# Patient Record
Sex: MALE | Race: WHITE | Employment: OTHER | URBAN - METROPOLITAN AREA
[De-identification: names, ages, dates, MRNs, and addresses within clinical notes are randomized per-mention and may not be internally consistent; named-entity substitution may affect disease eponyms.]

---

## 2017-01-04 ENCOUNTER — HOSPITAL ENCOUNTER (OUTPATIENT)
Dept: CT IMAGING | Age: 72
Discharge: HOME | End: 2017-01-04
Attending: SURGERY
Payer: MEDICARE

## 2017-01-04 VITALS — WEIGHT: 174 LBS | HEIGHT: 71 IN | BODY MASS INDEX: 24.36 KG/M2

## 2017-01-04 DIAGNOSIS — C18.9 COLON CARCINOMA (HCC): ICD-10-CM

## 2017-01-04 DIAGNOSIS — C44.509 CARCINOMA OF ABDOMINAL WALL: ICD-10-CM

## 2017-01-04 DIAGNOSIS — Z72.0 TOBACCO ABUSE: ICD-10-CM

## 2017-01-04 PROCEDURE — 74011000258 HC RX REV CODE- 258: Performed by: SURGERY

## 2017-01-04 PROCEDURE — 71260 CT THORAX DX C+: CPT

## 2017-01-04 PROCEDURE — 74011636320 HC RX REV CODE- 636/320: Performed by: SURGERY

## 2017-01-04 RX ORDER — SODIUM CHLORIDE 0.9 % (FLUSH) 0.9 %
10 SYRINGE (ML) INJECTION
Status: COMPLETED | OUTPATIENT
Start: 2017-01-04 | End: 2017-01-04

## 2017-01-04 RX ADMIN — Medication 10 ML: at 14:19

## 2017-01-04 RX ADMIN — IOPAMIDOL 80 ML: 755 INJECTION, SOLUTION INTRAVENOUS at 14:19

## 2017-01-04 RX ADMIN — SODIUM CHLORIDE 100 ML: 900 INJECTION, SOLUTION INTRAVENOUS at 14:20

## 2017-01-18 ENCOUNTER — HOSPITAL ENCOUNTER (OUTPATIENT)
Dept: RADIATION ONCOLOGY | Age: 72
Discharge: HOME | End: 2017-01-18
Payer: MEDICARE

## 2017-01-18 ENCOUNTER — HOSPITAL ENCOUNTER (OUTPATIENT)
Dept: LAB | Age: 72
Discharge: HOME | End: 2017-01-18
Payer: MEDICARE

## 2017-01-18 VITALS
OXYGEN SATURATION: 98 % | WEIGHT: 162.3 LBS | HEART RATE: 66 BPM | SYSTOLIC BLOOD PRESSURE: 99 MMHG | DIASTOLIC BLOOD PRESSURE: 65 MMHG | TEMPERATURE: 97.4 F | BODY MASS INDEX: 22.64 KG/M2

## 2017-01-18 DIAGNOSIS — C44.509 CARCINOMA OF ABDOMINAL WALL: ICD-10-CM

## 2017-01-18 LAB
ALBUMIN SERPL BCP-MCNC: 2.9 G/DL (ref 3.2–4.6)
ALBUMIN/GLOB SERPL: 0.7 {RATIO} (ref 1.2–3.5)
ALP SERPL-CCNC: 113 U/L (ref 50–136)
ALT SERPL-CCNC: 28 U/L (ref 12–65)
ANION GAP BLD CALC-SCNC: 4 MMOL/L (ref 7–16)
AST SERPL W P-5'-P-CCNC: 23 U/L (ref 15–37)
BASOPHILS # BLD AUTO: 0.1 K/UL (ref 0–0.2)
BASOPHILS # BLD: 1 % (ref 0–2)
BILIRUB SERPL-MCNC: 0.4 MG/DL (ref 0.2–1.1)
BUN SERPL-MCNC: 14 MG/DL (ref 8–23)
CALCIUM SERPL-MCNC: 9.1 MG/DL (ref 8.3–10.4)
CEA SERPL-MCNC: 2.5 NG/ML (ref 0–3)
CHLORIDE SERPL-SCNC: 105 MMOL/L (ref 98–107)
CO2 SERPL-SCNC: 30 MMOL/L (ref 23–32)
CREAT SERPL-MCNC: 0.83 MG/DL (ref 0.8–1.5)
DIFFERENTIAL METHOD BLD: ABNORMAL
EOSINOPHIL # BLD: 0.2 K/UL (ref 0–0.8)
EOSINOPHIL NFR BLD: 3 % (ref 0.5–7.8)
ERYTHROCYTE [DISTWIDTH] IN BLOOD BY AUTOMATED COUNT: 14.6 % (ref 11.9–14.6)
FERRITIN SERPL-MCNC: 613 NG/ML (ref 8–388)
GLOBULIN SER CALC-MCNC: 4.2 G/DL (ref 2.3–3.5)
GLUCOSE SERPL-MCNC: 104 MG/DL (ref 65–100)
HCT VFR BLD AUTO: 38.8 % (ref 41.1–50.3)
HGB BLD-MCNC: 12.3 G/DL (ref 13.6–17.2)
IRON SATN MFR SERPL: 48 %
IRON SERPL-MCNC: 105 UG/DL (ref 35–150)
LYMPHOCYTES # BLD AUTO: 27 % (ref 13–44)
LYMPHOCYTES # BLD: 1.5 K/UL (ref 0.5–4.6)
MAGNESIUM SERPL-MCNC: 2.2 MG/DL (ref 1.8–2.4)
MCH RBC QN AUTO: 29.9 PG (ref 26.1–32.9)
MCHC RBC AUTO-ENTMCNC: 31.7 G/DL (ref 31.4–35)
MCV RBC AUTO: 94.4 FL (ref 79.6–97.8)
MONOCYTES # BLD: 0.4 K/UL (ref 0.1–1.3)
MONOCYTES NFR BLD AUTO: 7 % (ref 4–12)
NEUTS SEG # BLD: 3.4 K/UL (ref 1.7–8.2)
NEUTS SEG NFR BLD AUTO: 61 % (ref 43–78)
NRBC # BLD: 0 K/UL (ref 0–0.2)
PLATELET # BLD AUTO: 305 K/UL (ref 150–450)
PMV BLD AUTO: 9.3 FL (ref 10.8–14.1)
POTASSIUM SERPL-SCNC: 4.2 MMOL/L (ref 3.5–5.1)
PROT SERPL-MCNC: 7.1 G/DL (ref 6.3–8.2)
RBC # BLD AUTO: 4.11 M/UL (ref 4.23–5.67)
SODIUM SERPL-SCNC: 139 MMOL/L (ref 136–145)
TIBC SERPL-MCNC: 221 UG/DL (ref 250–450)
WBC # BLD AUTO: 5.5 K/UL (ref 4.3–11.1)

## 2017-01-18 PROCEDURE — 80053 COMPREHEN METABOLIC PANEL: CPT | Performed by: INTERNAL MEDICINE

## 2017-01-18 PROCEDURE — 77470 SPECIAL RADIATION TREATMENT: CPT

## 2017-01-18 PROCEDURE — 83735 ASSAY OF MAGNESIUM: CPT | Performed by: INTERNAL MEDICINE

## 2017-01-18 PROCEDURE — 82728 ASSAY OF FERRITIN: CPT | Performed by: INTERNAL MEDICINE

## 2017-01-18 PROCEDURE — 83540 ASSAY OF IRON: CPT | Performed by: INTERNAL MEDICINE

## 2017-01-18 PROCEDURE — 85025 COMPLETE CBC W/AUTO DIFF WBC: CPT | Performed by: INTERNAL MEDICINE

## 2017-01-18 PROCEDURE — 36415 COLL VENOUS BLD VENIPUNCTURE: CPT | Performed by: INTERNAL MEDICINE

## 2017-01-18 PROCEDURE — 99211 OFF/OP EST MAY X REQ PHY/QHP: CPT

## 2017-01-18 PROCEDURE — 82378 CARCINOEMBRYONIC ANTIGEN: CPT | Performed by: INTERNAL MEDICINE

## 2017-01-18 NOTE — CONSULTS
Patient: Candice Pacheco MRN: [id number]  SSN: [id number]    YOB: 1945  Age: 80 y.o. Sex: male      Other Providers:  Rafi Davison MD, Belinda Griffin MD    CHIEF COMPLAINT: Cancer    DIAGNOSIS: [id number] with abdominal wall invasion and 3 mm margin. (Questionable liver lesion too small to characterize. PREVIOUS TREATMENT:  1) 12/13/16:     1. Open drainage of localized peritonitis. 2. Enterorrhaphy. 3. Right colectomy with ileocolostomy. 4. Open excision of peritoneal mass. 5. 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 HISTORY OF PRESENT ILLNESS:  Candice Pacheco is a 80 y.o. male who I am seeing at the request of Dr. Luna Ayala. He has a relatively benign medical history but is a former smoker and did not visit physicians regularly. He presented with a 3-4 week history of severe right lower quadrant abdominal pain. There was associated intermittent nausea and vomiting. He does not report a significant weight loss over this period. CT imaging showed an enhancing right lower quadrant mass associated with the cecal tip abutting the anterior inferior surface of the terminal ileum measuring 7.3 x 5.3 cm. This was felt to likely represent a primary colon neoplasm. There was also right inguinal and left inguinal hernia and a solitary 6 mm lesion in the left hepatic lobe which was felt to be too small to characterize. He was evaluated by surgery who planned for resection which took place 12/13/2016. 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 The total tumor size was 7.6 cm. There was no definite lymphovascular or perineural invasion identified but it was noted to be 0.3 cm from the inked radial margin. However, a separate submitted abdominal wall margin was submitted and negative. The peritoneal mass was a calcified peritoneal nodule and contained no malignancy.   0 of 12 lymph nodes identified contained tumor. He was seen by Dr. Jessica Lazcano postoperatively 12/28/2016 and noted to be doing well. Today he again notes he is doing well, eating normally with normal bowel movements. His incision midline is nearly fully healed at this point. He does report some difficult social dynamics caring for his wife who has fibromyalgia and prior stroke. He is also fairly simple but accompanied today by his son who is involved in his care. His case was discussed in tumor board and we felt radiation and medical oncology referral was necessary with potential radiation to prevent local recurrence which is the rationale for my visit today. PAST MEDICAL HISTORY:    Past Medical History   Diagnosis Date    Cancer [facility])     Prostate hypertrophy     Pulmonary embolism (HCC)     Smoker      1 pk/day x since age 26    Unspecified disorder of intestine        The patient denies history of collagen vascular diseases, pacemaker insertion, prior radiation or prior chemotherapy. PAST SURGICAL HISTORY:   Past Surgical History   Procedure Laterality Date    Hx gi         MEDICATIONS:     Current Outpatient Prescriptions:     LORazepam (ATIVAN) 0.5 mg tablet, Take 1 Tab by mouth three (3) times daily as needed for Anxiety. Max Daily Amount: 1.5 mg. Indications: ANXIETY, Disp: 45 Tab, Rfl: 0    tamsulosin (FLOMAX) 0.4 mg capsule, Take 1 Cap by mouth daily. Indications: Urinary Retention (Patient taking differently: Take 0.4 mg by mouth nightly. Indications: Urinary Retention), Disp: 30 Cap, Rfl: 3    ALLERGIES:   No Known Allergies    SOCIAL HISTORY:   Social History     Social History    Marital status:      Spouse name: N/A    Number of children: N/A    Years of education: N/A     Occupational History    Not on file.      Social History Main Topics    Smoking status: Former Smoker     Packs/day: 1.00     Types: Cigarettes     Quit date: 12/13/2016    Smokeless tobacco: Never Used   [employer] Alcohol use No    Drug use: No    Sexual activity: Not on file     Other Topics Concern    Not on file     Social History Narrative       FAMILY HISTORY:   Family History   Problem Relation Age of Onset    Cancer Mother      Pancreatic and Liver    Heart Disease Brother        REVIEW OF SYSTEMS: Please see the completed review of systems sheet in the chart that I have reviewed today. PHYSICAL EXAMINATION:   ECOG Performance status 1  VITAL SIGNS:   Visit Vitals    BP 99/65 (BP 1 Location: Left arm, BP Patient Position: Sitting)    Pulse 66    Temp 97.4 °F (36.3 °C) (Oral)    Wt 73.6 kg (162 lb 4.8 oz)    SpO2 98%    BMI 22.64 kg/m2        GENERAL: The patient is well-developed, ambulatory, alert and in no acute distress. HEENT: Head is normocephalic, atraumatic. Pupils are equal, round and reactive to light and accommodation. Extraocular movement intact. Hearing is intact bilaterally to finger rub. Oral cavity reveals no lesions. Mucous membranes are moist. NECK: Neck is supple with no masses. CARDIOVASCULAR: Heart is regular rate and rhythm. There are no murmurs rubs or gallups. Radial pulses are 2+ RESPIRATORY: Lungs are clear to auscultation and percussion. There is normal respiratory effort. GASTROINTESTINAL: The abdomen is soft, non-tender, nondistended with no hepatospelnomagaly. Large healing vertical midline incision. Digital rectal examination: deferred LYMPHATIC: There is no cervical, supraclavicular or axillary lymphadenopathy bilaterally. MUSCULOSKELETAL: Extremities reveal no cyanosis, clubbing or edema.  is 5+/5. NEURO:  Cranial nerves II-XII grossly intact. Muscular strength and sensation are intact throughout all four extremities.         PATHOLOGY:     12/13/16 s/p right hemicolectomy with resection of RLQ abd wall and ileocolostomy; Dr Chintan Linder: Ai Rose: - Andrea Mcintosh THE COLONIC WALL INTO THE ADJACENT ABDOMINAL WALL, 7.6 CM.   - DEFINITIVE LYMPHOVASCULAR OR PERINEURAL INVASION IS NOT IDENTIFIED.   - TUMOR IS 0.3 CM FROM THE INKED RADIAL MARGIN.   - TWELVE LYMPH NODES WITH NO TUMOR SEEN (0/12). - MARKED ACUTE SEROSITIS. B: ADDITIONAL RIGHT LOWER QUADRANT ABDOMINAL WALL: - FIBROFATTY TISSUE WITH ACUTE AND CHRONIC INFLAMMATION. NO TUMOR SEEN. C: FINAL MARGIN ABDOMINAL WALL: - FIBROFATTY TISSUE AND SKELETAL MUSCLE WITH ACUTE AND CHRONIC INFLAMMATION. NO TUMOR SEEN. D: Nery Gallon MASS: - CALCIFIED PERITONEAL NODULE. Electronically signed out on 12/15/2016 15:49 by Brittani Novoa M.D., Ph.D.          Brianna Alex:   Lab Results   Component Value Date/Time    Sodium 144 12/18/2016 05:24 AM    Potassium 3.6 12/18/2016 05:24 AM    Chloride 107 12/18/2016 05:24 AM    CO2 29 12/18/2016 05:24 AM    Anion gap 8 12/18/2016 05:24 AM    Glucose 105 12/18/2016 05:24 AM    BUN 3 12/18/2016 05:24 AM    Creatinine 0.50 12/18/2016 05:24 AM    GFR est AA >60 12/18/2016 05:24 AM    GFR est non-AA >60 12/18/2016 05:24 AM    Calcium 8.4 12/18/2016 05:24 AM    Albumin 2.7 12/12/2016 09:45 AM    Protein, total 7.2 12/12/2016 09:45 AM    Globulin 4.5 12/12/2016 09:45 AM    A-G Ratio 0.6 12/12/2016 09:45 AM    AST 49 12/12/2016 09:45 AM    ALT 34 12/12/2016 09:45 AM     Lab Results   Component Value Date/Time    WBC 4.8 12/18/2016 05:24 AM    HGB 11.2 12/18/2016 05:24 AM    HCT 34.8 12/18/2016 05:24 AM    PLATELET 339 17/83/6839 05:24 AM       RADIOLOGY:    I have personally reviewed the imaging and agree with the reports below.    Ct Chest W Cont    Result Date: 1/4/2017  CT of the chest with contrast. CLINICAL INDICATION: Colon cancer, evaluate for metastatic disease PROCEDURE: Serial thin section axial images obtained from the thoracic inlet through the upper abdomen following the administration of 80 cc of Optiray-350 intravenous contrast.  Radiation dose reduction techniques were used for this study. Our CT scanners use one or all of the following: Automated exposure control, adjusted of the mA and/or kV according to patient size, iterative reconstruction COMPARISON: Chest x-ray dated 12/12/2016 FINDINGS: No mediastinal or axillary adenopathy evident. There is an intraluminal filling defect in the left lower lobar pulmonary artery in keeping with acute pulmonary embolus. Pulmonary emboli also appreciated extending into the right upper and middle lobe pulmonary arteries. Clot burden is thought to be small. Dependent atelectatic changes are present. Emphysema is noted. There is no pneumothorax. No suspicious pulmonary nodules present to suspect pulmonary metastatic disease. Limited evaluation of the upper abdomen show the adrenal glands to be normal. No obvious lesions in the liver. No aggressive osseous lesions identified. IMPRESSION: 1. Small, acute bilateral pulmonary emboli with the largest clot noted in the left lower lobar pulmonary artery. 2. Bibasilar dependent atelectasis. 3. Emphysema. 4. No findings to indicate pulmonary metastatic disease. DC5 The critical results contained in this report were communicated to [facility] the nurse for the referring surgeon Dr. Person Back by Dr. Sparkle Herman on 1/4/2017 at 3:50 PM. Critical results were communicated as outlined in Section II.C.2.a.i of the ACR Practice Guideline for Communication of Diagnostic Imaging Findings. Ct Abd Pelv W Cont    Result Date: 12/2/2016  CT ABDOMEN AND PELVIS WITH CONTRAST 12/2/2016 HISTORY: Right lower quadrant abdominal mass and right lower quadrant pain with nausea and vomiting for 3 weeks. TECHNIQUE: The patient received oral contrast and 100 mL Isovue-370 nonionic IV contrast. Axial images were obtained through the abdomen and pelvis. Coronal reformatted images were generated.   All CT scans at this facility used dose modulation, interactive reconstruction and/or weight based dosing when appropriate to reduce radiation dose to as low as reasonably achievable. COMPARISON: None available FINDINGS: Included portions of the lung bases demonstrate lower lobe dependent atelectasis. ABDOMEN: There is an enhancing right lower quadrant mass associated with the cecal tip and abutting the anterior-inferior surface of the terminal ileum. This also extends into the right lower quadrant anterior abdominal wall and measures 7.3 x 5.3 cm in axial dimensions (image 54). A distended appendix emanates from the approximate location of this mass. This is likely a primary colon neoplasm with obstruction of the appendiceal orifice. An inflammatory mass associated with subacute appendicitis is considered less likely. There is a right inguinal hernia containing fat and a very small left inguinal hernia containing fat. There is a solitary 6 mm low-attenuation left hepatic lobe lesion (image 10) which is too small to further characterize. The gallbladder, pancreas, spleen, adrenal glands, kidneys are normal in appearance. PELVIS: Multiple sigmoid diverticula are present. The bladder is normal in appearance. There is no free pelvic fluid. There are no aggressive osseous lesions. IMPRESSION: 7 cm cecal mass in the right lower quadrant. A dilated appendix associated with this mass is likely distended because of obstruction. Surgical consultation is recommended. DC4 I discussed these findings with Dr. Ettie Leventhal at the time of dictation. IMPRESSION:  Esmer Santoyo is a 80 y.o. male with a primary colon neoplasm. I discussed at length with him and his son the risks for recurrence. Generally colon cancer is managed with surgery and adjuvant chemotherapy depending on stage. However, there are circumstances that suggest a high risk of local or regional recurrence such as T4 and close margin.   This was demonstrated in a retrospective review by [employer].  However, a large phase 3 trial attempting to establish a role for radiation was negative but did not specifically accrue patients with these high risk features and therefore I do feel radiation is warranted in this setting (Glenda et al). I would deliver this with concurrent chemotherapy and have spoken with Dr. Sasha Cloud about this matter who is in agreement. This was also discussed in tumor board. Generally we attempt to pursue treatment at 4-6 weeks. With him being at 4 weeks at this point, we'll proceed with simulation on Monday with plans to start in about 2 weeks. He was agreeable with this plan. Consent was obtained after reviewing logistics and side effects of therapy. PLAN:    1) Consented patient for treatment with external beam radiation after discussing risk, benefits, and side effects from treatment. 2) Reviewed available research treatment and cancer care protocols for which patient may be eligible. Unfortunately there are no matching clinical trials available at this time. 3) Coordinate care and start date with medical oncology, Dr. Sasha Cloud. 4) CT Simulation planned for Monday.       Estevan Duarte MD   January 18, 2017

## 2017-01-18 NOTE — NURSE NAVIGATOR
Pt here for consult for colon cancer. S/p right hemicolectomy. Pt of Dr. Sridhar De. N/S for NP apt with Dr. Eduardo Rust on 1-9-17. Pt states he was not aware. Ct chest 1-4-17-PE. CT A/P 12-2-16. Pt denies pain. CONSENTS SIGNED FOR RT TO THE ABDOMEN. CT [facility] SCHEDULED Monday, 1-23-17 AT 2PM.  APT GIVEN TO PT AND KERRI DIEGO.     Joaquín Beal RN

## 2017-01-23 ENCOUNTER — HOSPITAL ENCOUNTER (OUTPATIENT)
Dept: RADIATION ONCOLOGY | Age: 72
Discharge: HOME | End: 2017-01-23
Payer: MEDICARE

## 2017-01-30 ENCOUNTER — HOSPITAL ENCOUNTER (OUTPATIENT)
Dept: RADIATION ONCOLOGY | Age: 72
Discharge: HOME | End: 2017-01-30
Payer: MEDICARE

## 2017-01-30 PROCEDURE — 77399 UNLISTED PX MED RADJ PHYSICS: CPT

## 2017-01-30 PROCEDURE — 77300 RADIATION THERAPY DOSE PLAN: CPT

## 2017-01-30 PROCEDURE — 77301 RADIOTHERAPY DOSE PLAN IMRT: CPT

## 2017-01-31 ENCOUNTER — DOCUMENTATION ONLY (OUTPATIENT)
Dept: HEMATOLOGY | Age: 72
End: 2017-01-31

## 2017-01-31 ENCOUNTER — HOSPITAL ENCOUNTER (OUTPATIENT)
Dept: RADIATION ONCOLOGY | Age: 72
Discharge: HOME | End: 2017-01-31
Payer: MEDICARE

## 2017-01-31 PROCEDURE — 77338 DESIGN MLC DEVICE FOR IMRT: CPT

## 2017-02-01 NOTE — PROGRESS NOTES
I spoke with Mr. Pipo An regarding his insurance coverage, potential oral medication authorizations, enrollment in the [address] [place] [address] ([facility]) and the [address] [place] [address] ([address]), and assistance organization resource sheet. Mr. Pipo An has [facility] Medicare Complete insurance. He obtained co-pay assistance for the Xeloda oral chemo to take care of the approximately $700 co-pay for the medication. This same cammy will also pay for Mr. Td Napier patient responsibility for his FOLFOX IV chemotherapy as well. Next, I spoke with Mr. Pipo An regarding potential oral medication authorizations. I told him that if he ever had any problems getting his oral medications filled to give the dedicated [address], a call. Most of the time, it is simply an authorization that needs to be done with the insurance company. Next, I spoke with Mr. Pipo An regarding enrolling with [facility] and [facility]. I went over some of the services that [facility] offers and the enrollment process. Lastly, I gave Mr. Pipo An a form with various resource organizations that could assist with specific needs (example:  transportation, lodging, preparing meals, home cleaning)     Faxed Patient Referral form to the [address] [place], [place] [address] at [phone]. Phone [phone]. Form scanned into chart. Faxed Physician's Statement to the [id number] [place] [address] at [phone]. Phone [phone]. Form scanned into chart.

## 2017-02-06 ENCOUNTER — HOSPITAL ENCOUNTER (OUTPATIENT)
Dept: RADIATION ONCOLOGY | Age: 72
Discharge: HOME | End: 2017-02-06
Payer: MEDICARE

## 2017-02-06 PROCEDURE — 77386 HC IMRT TRMT DLVR COMPL: CPT

## 2017-02-06 NOTE — PROGRESS NOTES
Patient: Nicohlas Munguia MRN: [id number]  SSN: [id number]    YOB: 1945  Age: 80 y.o. Sex: male      DIAGNOSIS: [id number] with abdominal wall invasion and 3 mm margin. (Questionable liver lesion too small to characterize.      PREVIOUS TREATMENT:  1) 12/13/16:   1. Open drainage of localized peritonitis. 2. Enterorrhaphy. 3. Right colectomy with ileocolostomy. 4. Open excision of peritoneal mass. 5. 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 TREATMENT SITE:  Colon    DOSE and FRACTIONATION:  1/28 fractions, 180 cGy of 5040 cGy planned. INTERVAL HISTORY:  Nicholas Munguia is a 80 y.o. male being treated for colon cancer with abdominal wall invasion. He is doing well without complaints. OBJECTIVE:  No findings. There were no vitals taken for this visit. Lab Results   Component Value Date/Time    Sodium 139 01/18/2017 11:38 AM    Potassium 4.2 01/18/2017 11:38 AM    Chloride 105 01/18/2017 11:38 AM    CO2 30 01/18/2017 11:38 AM    Anion gap 4 01/18/2017 11:38 AM    Glucose 104 01/18/2017 11:38 AM    BUN 14 01/18/2017 11:38 AM    Creatinine 0.83 01/18/2017 11:38 AM    GFR est AA >60 01/18/2017 11:38 AM    GFR est non-AA >60 01/18/2017 11:38 AM    Calcium 9.1 01/18/2017 11:38 AM    Magnesium 2.2 01/18/2017 11:38 AM    Albumin 2.9 01/18/2017 11:38 AM    Protein, total 7.1 01/18/2017 11:38 AM    Globulin 4.2 01/18/2017 11:38 AM    A-G Ratio 0.7 01/18/2017 11:38 AM    AST (SGOT) 23 01/18/2017 11:38 AM    ALT (SGPT) 28 01/18/2017 11:38 AM     Lab Results   Component Value Date/Time    WBC 5.5 01/18/2017 11:38 AM    HGB 12.3 01/18/2017 11:38 AM    HCT 38.8 01/18/2017 11:38 AM    PLATELET 392 76/21/9639 11:38 AM       ASSESSMENT and PLAN:  Nicholas Munguia is tolerating radiation as anticipated for the current dose and fraction. We will continue on as planned with another treatment visit anticipated next week.         Walter Fink MD   February 6, 2017

## 2017-02-07 ENCOUNTER — HOSPITAL ENCOUNTER (OUTPATIENT)
Dept: RADIATION ONCOLOGY | Age: 72
Discharge: HOME | End: 2017-02-07
Payer: MEDICARE

## 2017-02-07 PROCEDURE — 77386 HC IMRT TRMT DLVR COMPL: CPT

## 2017-02-08 ENCOUNTER — HOSPITAL ENCOUNTER (OUTPATIENT)
Dept: RADIATION ONCOLOGY | Age: 72
Discharge: HOME | End: 2017-02-08
Payer: MEDICARE

## 2017-02-08 PROCEDURE — 77386 HC IMRT TRMT DLVR COMPL: CPT

## 2017-02-09 ENCOUNTER — HOSPITAL ENCOUNTER (OUTPATIENT)
Dept: RADIATION ONCOLOGY | Age: 72
Discharge: HOME | End: 2017-02-09
Payer: MEDICARE

## 2017-02-09 PROCEDURE — 77386 HC IMRT TRMT DLVR COMPL: CPT

## 2017-02-10 ENCOUNTER — HOSPITAL ENCOUNTER (OUTPATIENT)
Dept: RADIATION ONCOLOGY | Age: 72
Discharge: HOME | End: 2017-02-10
Payer: MEDICARE

## 2017-02-10 PROCEDURE — 77336 RADIATION PHYSICS CONSULT: CPT

## 2017-02-10 PROCEDURE — 77386 HC IMRT TRMT DLVR COMPL: CPT

## 2017-02-13 ENCOUNTER — HOSPITAL ENCOUNTER (OUTPATIENT)
Dept: RADIATION ONCOLOGY | Age: 72
Discharge: HOME | End: 2017-02-13
Payer: MEDICARE

## 2017-02-13 ENCOUNTER — HOSPITAL ENCOUNTER (OUTPATIENT)
Dept: LAB | Age: 72
Discharge: HOME | End: 2017-02-13
Payer: MEDICARE

## 2017-02-13 DIAGNOSIS — C18.9 COLON CARCINOMA (HCC): ICD-10-CM

## 2017-02-13 LAB
ALBUMIN SERPL BCP-MCNC: 3 G/DL (ref 3.2–4.6)
ALBUMIN/GLOB SERPL: 0.7 {RATIO} (ref 1.2–3.5)
ALP SERPL-CCNC: 120 U/L (ref 50–136)
ALT SERPL-CCNC: 30 U/L (ref 12–65)
ANION GAP BLD CALC-SCNC: 8 MMOL/L (ref 7–16)
AST SERPL W P-5'-P-CCNC: 32 U/L (ref 15–37)
BASOPHILS # BLD AUTO: 0 K/UL (ref 0–0.2)
BASOPHILS # BLD: 1 % (ref 0–2)
BILIRUB SERPL-MCNC: 0.4 MG/DL (ref 0.2–1.1)
BUN SERPL-MCNC: 13 MG/DL (ref 8–23)
CALCIUM SERPL-MCNC: 9.1 MG/DL (ref 8.3–10.4)
CEA SERPL-MCNC: 2.8 NG/ML (ref 0–3)
CHLORIDE SERPL-SCNC: 103 MMOL/L (ref 98–107)
CO2 SERPL-SCNC: 29 MMOL/L (ref 23–32)
CREAT SERPL-MCNC: 0.86 MG/DL (ref 0.8–1.5)
DIFFERENTIAL METHOD BLD: ABNORMAL
EOSINOPHIL # BLD: 0.1 K/UL (ref 0–0.8)
EOSINOPHIL NFR BLD: 3 % (ref 0.5–7.8)
ERYTHROCYTE [DISTWIDTH] IN BLOOD BY AUTOMATED COUNT: 15.9 % (ref 11.9–14.6)
FERRITIN SERPL-MCNC: 518 NG/ML (ref 8–388)
GLOBULIN SER CALC-MCNC: 4.4 G/DL (ref 2.3–3.5)
GLUCOSE SERPL-MCNC: 96 MG/DL (ref 65–100)
HCT VFR BLD AUTO: 38.5 % (ref 41.1–50.3)
HGB BLD-MCNC: 12.4 G/DL (ref 13.6–17.2)
IRON SATN MFR SERPL: 38 %
IRON SERPL-MCNC: 102 UG/DL (ref 35–150)
LYMPHOCYTES # BLD AUTO: 33 % (ref 13–44)
LYMPHOCYTES # BLD: 1.3 K/UL (ref 0.5–4.6)
MAGNESIUM SERPL-MCNC: 2.5 MG/DL (ref 1.8–2.4)
MCH RBC QN AUTO: 30.4 PG (ref 26.1–32.9)
MCHC RBC AUTO-ENTMCNC: 32.2 G/DL (ref 31.4–35)
MCV RBC AUTO: 94.4 FL (ref 79.6–97.8)
MONOCYTES # BLD: 0.4 K/UL (ref 0.1–1.3)
MONOCYTES NFR BLD AUTO: 10 % (ref 4–12)
NEUTS SEG # BLD: 2 K/UL (ref 1.7–8.2)
NEUTS SEG NFR BLD AUTO: 53 % (ref 43–78)
NRBC # BLD: 0 K/UL (ref 0–0.2)
PLATELET # BLD AUTO: 249 K/UL (ref 150–450)
PMV BLD AUTO: 9 FL (ref 10.8–14.1)
POTASSIUM SERPL-SCNC: 4.4 MMOL/L (ref 3.5–5.1)
PROT SERPL-MCNC: 7.4 G/DL (ref 6.3–8.2)
RBC # BLD AUTO: 4.08 M/UL (ref 4.23–5.67)
SODIUM SERPL-SCNC: 140 MMOL/L (ref 136–145)
TIBC SERPL-MCNC: 271 UG/DL (ref 250–450)
WBC # BLD AUTO: 3.8 K/UL (ref 4.3–11.1)

## 2017-02-13 PROCEDURE — 36415 COLL VENOUS BLD VENIPUNCTURE: CPT | Performed by: INTERNAL MEDICINE

## 2017-02-13 PROCEDURE — 82728 ASSAY OF FERRITIN: CPT | Performed by: INTERNAL MEDICINE

## 2017-02-13 PROCEDURE — 83540 ASSAY OF IRON: CPT | Performed by: INTERNAL MEDICINE

## 2017-02-13 PROCEDURE — 77386 HC IMRT TRMT DLVR COMPL: CPT

## 2017-02-13 PROCEDURE — 83735 ASSAY OF MAGNESIUM: CPT | Performed by: INTERNAL MEDICINE

## 2017-02-13 PROCEDURE — 82378 CARCINOEMBRYONIC ANTIGEN: CPT | Performed by: INTERNAL MEDICINE

## 2017-02-13 PROCEDURE — 80053 COMPREHEN METABOLIC PANEL: CPT | Performed by: INTERNAL MEDICINE

## 2017-02-13 PROCEDURE — 85025 COMPLETE CBC W/AUTO DIFF WBC: CPT | Performed by: INTERNAL MEDICINE

## 2017-02-13 NOTE — PROGRESS NOTES
Patient: Constance Bartholomew MRN: [id number]  SSN: [id number]    YOB: 1945  Age: 80 y.o. Sex: male      DIAGNOSIS: [id number] with abdominal wall invasion and 3 mm margin. (Questionable liver lesion too small to characterize.      PREVIOUS TREATMENT:  1) 12/13/16:   1. Open drainage of localized peritonitis. 2. Enterorrhaphy. 3. Right colectomy with ileocolostomy. 4. Open excision of peritoneal mass. 5. 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 TREATMENT SITE:  Colon    DOSE and FRACTIONATION:  6/28 fractions, 1080 cGy of 5040 cGy planned. INTERVAL HISTORY:  Constance Bartholomew is a 80 y.o. male being treated for colon cancer with abdominal wall invasion. He was doing well without complaints through week 2. OBJECTIVE:  No findings. There were no vitals taken for this visit. Lab Results   Component Value Date/Time    Sodium 139 01/18/2017 11:38 AM    Potassium 4.2 01/18/2017 11:38 AM    Chloride 105 01/18/2017 11:38 AM    CO2 30 01/18/2017 11:38 AM    Anion gap 4 01/18/2017 11:38 AM    Glucose 104 01/18/2017 11:38 AM    BUN 14 01/18/2017 11:38 AM    Creatinine 0.83 01/18/2017 11:38 AM    GFR est AA >60 01/18/2017 11:38 AM    GFR est non-AA >60 01/18/2017 11:38 AM    Calcium 9.1 01/18/2017 11:38 AM    Magnesium 2.2 01/18/2017 11:38 AM    Albumin 2.9 01/18/2017 11:38 AM    Protein, total 7.1 01/18/2017 11:38 AM    Globulin 4.2 01/18/2017 11:38 AM    A-G Ratio 0.7 01/18/2017 11:38 AM    AST (SGOT) 23 01/18/2017 11:38 AM    ALT (SGPT) 28 01/18/2017 11:38 AM     Lab Results   Component Value Date/Time    WBC 5.5 01/18/2017 11:38 AM    HGB 12.3 01/18/2017 11:38 AM    HCT 38.8 01/18/2017 11:38 AM    PLATELET 613 44/56/0810 11:38 AM       ASSESSMENT and PLAN:  Constance Bartholomew is tolerating radiation as anticipated for the current dose and fraction. We will continue on as planned with another treatment visit anticipated next week.         Jose Daniel Calderon MD   February 13, 2017

## 2017-02-14 ENCOUNTER — HOSPITAL ENCOUNTER (OUTPATIENT)
Dept: RADIATION ONCOLOGY | Age: 72
Discharge: HOME | End: 2017-02-14
Payer: MEDICARE

## 2017-02-14 PROCEDURE — 77386 HC IMRT TRMT DLVR COMPL: CPT

## 2017-02-15 ENCOUNTER — HOSPITAL ENCOUNTER (OUTPATIENT)
Dept: RADIATION ONCOLOGY | Age: 72
Discharge: HOME | End: 2017-02-15
Payer: MEDICARE

## 2017-02-15 PROCEDURE — 77386 HC IMRT TRMT DLVR COMPL: CPT

## 2017-02-16 ENCOUNTER — HOSPITAL ENCOUNTER (OUTPATIENT)
Dept: RADIATION ONCOLOGY | Age: 72
Discharge: HOME | End: 2017-02-16
Payer: MEDICARE

## 2017-02-16 PROCEDURE — 77386 HC IMRT TRMT DLVR COMPL: CPT

## 2017-02-17 ENCOUNTER — HOSPITAL ENCOUNTER (OUTPATIENT)
Dept: RADIATION ONCOLOGY | Age: 72
Discharge: HOME | End: 2017-02-17
Payer: MEDICARE

## 2017-02-17 PROCEDURE — 77386 HC IMRT TRMT DLVR COMPL: CPT

## 2017-02-17 PROCEDURE — 77336 RADIATION PHYSICS CONSULT: CPT

## 2017-02-20 ENCOUNTER — HOSPITAL ENCOUNTER (OUTPATIENT)
Dept: RADIATION ONCOLOGY | Age: 72
Discharge: HOME | End: 2017-02-20
Payer: MEDICARE

## 2017-02-20 PROCEDURE — 77386 HC IMRT TRMT DLVR COMPL: CPT

## 2017-02-20 NOTE — PROGRESS NOTES
Patient: Emmett Alfaro MRN: [id number]  SSN: [id number]    YOB: 1945  Age: 80 y.o. Sex: male      DIAGNOSIS: [id number] with abdominal wall invasion and 3 mm margin. (Questionable liver lesion too small to characterize.      PREVIOUS TREATMENT:  1) 12/13/16:   1. Open drainage of localized peritonitis. 2. Enterorrhaphy. 3. Right colectomy with ileocolostomy. 4. Open excision of peritoneal mass. 5. 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 TREATMENT SITE:  Colon    DOSE and FRACTIONATION:  11/28 fractions, 1980 cGy of 5040 cGy planned. INTERVAL HISTORY:  Emmett Alfaro is a 80 y.o. male being treated for colon cancer with abdominal wall invasion. He was doing well without complaints through week 2. Looser stools week 3. OBJECTIVE:  No findings. There were no vitals taken for this visit. Lab Results   Component Value Date/Time    Sodium 140 02/13/2017 02:10 PM    Potassium 4.4 02/13/2017 02:10 PM    Chloride 103 02/13/2017 02:10 PM    CO2 29 02/13/2017 02:10 PM    Anion gap 8 02/13/2017 02:10 PM    Glucose 96 02/13/2017 02:10 PM    BUN 13 02/13/2017 02:10 PM    Creatinine 0.86 02/13/2017 02:10 PM    GFR est AA >60 02/13/2017 02:10 PM    GFR est non-AA >60 02/13/2017 02:10 PM    Calcium 9.1 02/13/2017 02:10 PM    Magnesium 2.5 02/13/2017 02:10 PM    Albumin 3.0 02/13/2017 02:10 PM    Protein, total 7.4 02/13/2017 02:10 PM    Globulin 4.4 02/13/2017 02:10 PM    A-G Ratio 0.7 02/13/2017 02:10 PM    AST (SGOT) 32 02/13/2017 02:10 PM    ALT (SGPT) 30 02/13/2017 02:10 PM     Lab Results   Component Value Date/Time    WBC 3.8 02/13/2017 02:10 PM    HGB 12.4 02/13/2017 02:10 PM    HCT 38.5 02/13/2017 02:10 PM    PLATELET 943 58/06/6740 02:10 PM       ASSESSMENT and PLAN:  Emmett Alfaro is tolerating radiation as anticipated for the current dose and fraction. We will continue on as planned with another treatment visit anticipated next week.         Madisyn Martinez Alberto Zhao MD   February 20, 2017

## 2017-02-21 ENCOUNTER — HOSPITAL ENCOUNTER (OUTPATIENT)
Dept: RADIATION ONCOLOGY | Age: 72
Discharge: HOME | End: 2017-02-21
Payer: MEDICARE

## 2017-02-21 PROCEDURE — 77386 HC IMRT TRMT DLVR COMPL: CPT

## 2017-02-22 ENCOUNTER — HOSPITAL ENCOUNTER (OUTPATIENT)
Dept: RADIATION ONCOLOGY | Age: 72
Discharge: HOME | End: 2017-02-22
Payer: MEDICARE

## 2017-02-22 PROCEDURE — 77386 HC IMRT TRMT DLVR COMPL: CPT

## 2017-02-23 ENCOUNTER — HOSPITAL ENCOUNTER (OUTPATIENT)
Dept: RADIATION ONCOLOGY | Age: 72
Discharge: HOME | End: 2017-02-23
Payer: MEDICARE

## 2017-02-23 PROCEDURE — 77386 HC IMRT TRMT DLVR COMPL: CPT

## 2017-02-24 ENCOUNTER — HOSPITAL ENCOUNTER (OUTPATIENT)
Dept: RADIATION ONCOLOGY | Age: 72
Discharge: HOME | End: 2017-02-24
Payer: MEDICARE

## 2017-02-24 PROCEDURE — 77336 RADIATION PHYSICS CONSULT: CPT

## 2017-02-24 PROCEDURE — 77386 HC IMRT TRMT DLVR COMPL: CPT

## 2017-02-27 ENCOUNTER — HOSPITAL ENCOUNTER (OUTPATIENT)
Dept: RADIATION ONCOLOGY | Age: 72
Discharge: HOME | End: 2017-02-27
Payer: MEDICARE

## 2017-02-27 PROCEDURE — 77386 HC IMRT TRMT DLVR COMPL: CPT

## 2017-02-27 NOTE — PROGRESS NOTES
Patient: Philip Pickett MRN: [id number]  SSN: [id number]    YOB: 1945  Age: 80 y.o. Sex: male      DIAGNOSIS: [id number] with abdominal wall invasion and 3 mm margin. (Questionable liver lesion too small to characterize.      PREVIOUS TREATMENT:  1) 12/13/16:   1. Open drainage of localized peritonitis. 2. Enterorrhaphy. 3. Right colectomy with ileocolostomy. 4. Open excision of peritoneal mass. 5. 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 TREATMENT SITE:  Colon    DOSE and FRACTIONATION:  11/28 fractions, 1980 cGy of 5040 cGy planned. INTERVAL HISTORY:  Philip Pickett is a 80 y.o. male being treated for colon cancer with abdominal wall invasion. He was doing well without complaints through week 2. Looser stools week 3. Week 4 stools are stable. No nausea or vomiting. Denies dysuria and hematuria. Stools are soft. Denies for red b  OBJECTIVE:  No findings. There were no vitals taken for this visit.     Lab Results   Component Value Date/Time    Sodium 140 02/13/2017 02:10 PM    Potassium 4.4 02/13/2017 02:10 PM    Chloride 103 02/13/2017 02:10 PM    CO2 29 02/13/2017 02:10 PM    Anion gap 8 02/13/2017 02:10 PM    Glucose 96 02/13/2017 02:10 PM    BUN 13 02/13/2017 02:10 PM    Creatinine 0.86 02/13/2017 02:10 PM    GFR est AA >60 02/13/2017 02:10 PM    GFR est non-AA >60 02/13/2017 02:10 PM    Calcium 9.1 02/13/2017 02:10 PM    Magnesium 2.5 02/13/2017 02:10 PM    Albumin 3.0 02/13/2017 02:10 PM    Protein, total 7.4 02/13/2017 02:10 PM    Globulin 4.4 02/13/2017 02:10 PM    A-G Ratio 0.7 02/13/2017 02:10 PM    AST (SGOT) 32 02/13/2017 02:10 PM    ALT (SGPT) 30 02/13/2017 02:10 PM     Lab Results   Component Value Date/Time    WBC 3.8 02/13/2017 02:10 PM    HGB 12.4 02/13/2017 02:10 PM    HCT 38.5 02/13/2017 02:10 PM    PLATELET 871 54/10/1214 02:10 PM       ASSESSMENT and PLAN:  Philip Pickett is tolerating radiation as anticipated for the current dose and fraction. We will continue on as planned with another treatment visit anticipated next week.         Corrina Abreu MD   February 27, 2017

## 2017-02-28 ENCOUNTER — HOSPITAL ENCOUNTER (OUTPATIENT)
Dept: RADIATION ONCOLOGY | Age: 72
Discharge: HOME | End: 2017-02-28
Payer: MEDICARE

## 2017-02-28 PROCEDURE — 77386 HC IMRT TRMT DLVR COMPL: CPT

## 2017-03-01 ENCOUNTER — HOSPITAL ENCOUNTER (OUTPATIENT)
Dept: RADIATION ONCOLOGY | Age: 72
Discharge: HOME | End: 2017-03-01
Payer: MEDICARE

## 2017-03-01 PROCEDURE — 77386 HC IMRT TRMT DLVR COMPL: CPT

## 2017-03-02 ENCOUNTER — HOSPITAL ENCOUNTER (OUTPATIENT)
Dept: RADIATION ONCOLOGY | Age: 72
Discharge: HOME | End: 2017-03-02
Payer: MEDICARE

## 2017-03-02 ENCOUNTER — HOSPITAL ENCOUNTER (OUTPATIENT)
Dept: LAB | Age: 72
Discharge: HOME | End: 2017-03-02
Payer: MEDICARE

## 2017-03-02 DIAGNOSIS — C18.9 COLON CARCINOMA (HCC): ICD-10-CM

## 2017-03-02 LAB
ALBUMIN SERPL BCP-MCNC: 2.9 G/DL (ref 3.2–4.6)
ALBUMIN/GLOB SERPL: 0.6 {RATIO} (ref 1.2–3.5)
ALP SERPL-CCNC: 113 U/L (ref 50–136)
ALT SERPL-CCNC: 15 U/L (ref 12–65)
ANION GAP BLD CALC-SCNC: 6 MMOL/L (ref 7–16)
AST SERPL W P-5'-P-CCNC: 17 U/L (ref 15–37)
BASOPHILS # BLD AUTO: 0 K/UL (ref 0–0.2)
BASOPHILS # BLD: 1 % (ref 0–2)
BILIRUB SERPL-MCNC: 0.4 MG/DL (ref 0.2–1.1)
BUN SERPL-MCNC: 14 MG/DL (ref 8–23)
CALCIUM SERPL-MCNC: 9.1 MG/DL (ref 8.3–10.4)
CHLORIDE SERPL-SCNC: 105 MMOL/L (ref 98–107)
CO2 SERPL-SCNC: 30 MMOL/L (ref 23–32)
CREAT SERPL-MCNC: 0.77 MG/DL (ref 0.8–1.5)
DIFFERENTIAL METHOD BLD: ABNORMAL
EOSINOPHIL # BLD: 0.1 K/UL (ref 0–0.8)
EOSINOPHIL NFR BLD: 7 % (ref 0.5–7.8)
ERYTHROCYTE [DISTWIDTH] IN BLOOD BY AUTOMATED COUNT: 17.4 % (ref 11.9–14.6)
GLOBULIN SER CALC-MCNC: 4.6 G/DL (ref 2.3–3.5)
GLUCOSE SERPL-MCNC: 103 MG/DL (ref 65–100)
HCT VFR BLD AUTO: 37.1 % (ref 41.1–50.3)
HGB BLD-MCNC: 12.3 G/DL (ref 13.6–17.2)
LYMPHOCYTES # BLD AUTO: 27 % (ref 13–44)
LYMPHOCYTES # BLD: 0.6 K/UL (ref 0.5–4.6)
MCH RBC QN AUTO: 31.1 PG (ref 26.1–32.9)
MCHC RBC AUTO-ENTMCNC: 33.2 G/DL (ref 31.4–35)
MCV RBC AUTO: 93.9 FL (ref 79.6–97.8)
MONOCYTES # BLD: 0.3 K/UL (ref 0.1–1.3)
MONOCYTES NFR BLD AUTO: 13 % (ref 4–12)
NEUTS SEG # BLD: 1.1 K/UL (ref 1.7–8.2)
NEUTS SEG NFR BLD AUTO: 53 % (ref 43–78)
NRBC # BLD: 0 K/UL (ref 0–0.2)
PLATELET # BLD AUTO: 191 K/UL (ref 150–450)
PMV BLD AUTO: 8.6 FL (ref 10.8–14.1)
POTASSIUM SERPL-SCNC: 3.7 MMOL/L (ref 3.5–5.1)
PROT SERPL-MCNC: 7.5 G/DL (ref 6.3–8.2)
RBC # BLD AUTO: 3.95 M/UL (ref 4.23–5.67)
SODIUM SERPL-SCNC: 141 MMOL/L (ref 136–145)
WBC # BLD AUTO: 2.1 K/UL (ref 4.3–11.1)

## 2017-03-02 PROCEDURE — 36415 COLL VENOUS BLD VENIPUNCTURE: CPT | Performed by: NURSE PRACTITIONER

## 2017-03-02 PROCEDURE — 77386 HC IMRT TRMT DLVR COMPL: CPT

## 2017-03-02 PROCEDURE — 80053 COMPREHEN METABOLIC PANEL: CPT | Performed by: NURSE PRACTITIONER

## 2017-03-02 PROCEDURE — 85025 COMPLETE CBC W/AUTO DIFF WBC: CPT | Performed by: NURSE PRACTITIONER

## 2017-03-03 ENCOUNTER — HOSPITAL ENCOUNTER (OUTPATIENT)
Dept: RADIATION ONCOLOGY | Age: 72
Discharge: HOME | End: 2017-03-03
Payer: MEDICARE

## 2017-03-03 PROCEDURE — 77386 HC IMRT TRMT DLVR COMPL: CPT

## 2017-03-03 PROCEDURE — 77336 RADIATION PHYSICS CONSULT: CPT

## 2017-03-06 ENCOUNTER — HOSPITAL ENCOUNTER (OUTPATIENT)
Dept: RADIATION ONCOLOGY | Age: 72
Discharge: HOME | End: 2017-03-06
Payer: MEDICARE

## 2017-03-06 PROCEDURE — 77386 HC IMRT TRMT DLVR COMPL: CPT

## 2017-03-06 NOTE — PROGRESS NOTES
Patient: Anisha Cazares MRN: [id number]  SSN: [id number]    YOB: 1945  Age: 80 y.o. Sex: male      DIAGNOSIS: [id number] with abdominal wall invasion and 3 mm margin. (Questionable liver lesion too small to characterize.      PREVIOUS TREATMENT:  1) 12/13/16:   1. Open drainage of localized peritonitis. 2. Enterorrhaphy. 3. Right colectomy with ileocolostomy. 4. Open excision of peritoneal mass. 5. 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 TREATMENT SITE:  Colon    DOSE and FRACTIONATION:  21/28 fractions, 3780 cGy of 5040 cGy planned. INTERVAL HISTORY:  Anisha Cazares is a 80 y.o. male being treated for colon cancer with abdominal wall invasion. He was doing well without complaints through week 2. Looser stools week 3. Week 4 stools are stable. No nausea or vomiting. Denies dysuria and hematuria. Stools are soft. Mild fatigue week 5. OBJECTIVE:  No findings. There were no vitals taken for this visit.     Lab Results   Component Value Date/Time    Sodium 141 03/02/2017 11:14 AM    Potassium 3.7 03/02/2017 11:14 AM    Chloride 105 03/02/2017 11:14 AM    CO2 30 03/02/2017 11:14 AM    Anion gap 6 03/02/2017 11:14 AM    Glucose 103 03/02/2017 11:14 AM    BUN 14 03/02/2017 11:14 AM    Creatinine 0.77 03/02/2017 11:14 AM    GFR est AA >60 03/02/2017 11:14 AM    GFR est non-AA >60 03/02/2017 11:14 AM    Calcium 9.1 03/02/2017 11:14 AM    Magnesium 2.5 02/13/2017 02:10 PM    Albumin 2.9 03/02/2017 11:14 AM    Protein, total 7.5 03/02/2017 11:14 AM    Globulin 4.6 03/02/2017 11:14 AM    A-G Ratio 0.6 03/02/2017 11:14 AM    AST (SGOT) 17 03/02/2017 11:14 AM    ALT (SGPT) 15 03/02/2017 11:14 AM     Lab Results   Component Value Date/Time    WBC 2.1 03/02/2017 11:14 AM    HGB 12.3 03/02/2017 11:14 AM    HCT 37.1 03/02/2017 11:14 AM    PLATELET 177 40/35/4354 11:14 AM       ASSESSMENT and PLAN:  Anisha Cazares is tolerating radiation as anticipated for the current dose and fraction. We will continue on as planned with another treatment visit anticipated next week.         Mich Arce MD   March 6, 2017

## 2017-03-07 ENCOUNTER — HOSPITAL ENCOUNTER (OUTPATIENT)
Dept: RADIATION ONCOLOGY | Age: 72
Discharge: HOME | End: 2017-03-07
Payer: MEDICARE

## 2017-03-07 PROCEDURE — 77386 HC IMRT TRMT DLVR COMPL: CPT

## 2017-03-08 ENCOUNTER — HOSPITAL ENCOUNTER (OUTPATIENT)
Dept: RADIATION ONCOLOGY | Age: 72
Discharge: HOME | End: 2017-03-08
Payer: MEDICARE

## 2017-03-08 PROCEDURE — 77386 HC IMRT TRMT DLVR COMPL: CPT

## 2017-03-09 ENCOUNTER — HOSPITAL ENCOUNTER (OUTPATIENT)
Dept: RADIATION ONCOLOGY | Age: 72
Discharge: HOME | End: 2017-03-09
Payer: MEDICARE

## 2017-03-09 PROCEDURE — 77386 HC IMRT TRMT DLVR COMPL: CPT

## 2017-03-10 ENCOUNTER — HOSPITAL ENCOUNTER (OUTPATIENT)
Dept: RADIATION ONCOLOGY | Age: 72
Discharge: HOME | End: 2017-03-10
Payer: MEDICARE

## 2017-03-10 PROCEDURE — 77386 HC IMRT TRMT DLVR COMPL: CPT

## 2017-03-10 PROCEDURE — 77336 RADIATION PHYSICS CONSULT: CPT

## 2017-03-13 ENCOUNTER — HOSPITAL ENCOUNTER (OUTPATIENT)
Dept: RADIATION ONCOLOGY | Age: 72
Discharge: HOME | End: 2017-03-13
Payer: MEDICARE

## 2017-03-13 PROCEDURE — 77386 HC IMRT TRMT DLVR COMPL: CPT

## 2017-03-13 NOTE — PROGRESS NOTES
Patient: Viktoriya Cerrato MRN: [id number]  SSN: [id number]    YOB: 1945  Age: 80 y.o. Sex: male      DIAGNOSIS: [id number] with abdominal wall invasion and 3 mm margin. (Questionable liver lesion too small to characterize.      PREVIOUS TREATMENT:  1) 12/13/16:   1. Open drainage of localized peritonitis. 2. Enterorrhaphy. 3. Right colectomy with ileocolostomy. 4. Open excision of peritoneal mass. 5. 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 TREATMENT SITE:  Colon    DOSE and FRACTIONATION:  26/28 fractions, 4680 cGy of 5040 cGy planned. INTERVAL HISTORY:  Viktoriya Cerrato is a 80 y.o. male being treated for colon cancer with abdominal wall invasion. He was doing well without complaints through week 2. Looser stools week 3. Week 4 stools are stable. No nausea or vomiting. Denies dysuria and hematuria. Stools are soft. Mild fatigue week 5. No new changes week 6. Concerned about not having Xeloda refilled so checking with med/onc. OBJECTIVE:  No findings. No skin changes. There were no vitals taken for this visit.     Lab Results   Component Value Date/Time    Sodium 141 03/02/2017 11:14 AM    Potassium 3.7 03/02/2017 11:14 AM    Chloride 105 03/02/2017 11:14 AM    CO2 30 03/02/2017 11:14 AM    Anion gap 6 03/02/2017 11:14 AM    Glucose 103 03/02/2017 11:14 AM    BUN 14 03/02/2017 11:14 AM    Creatinine 0.77 03/02/2017 11:14 AM    GFR est AA >60 03/02/2017 11:14 AM    GFR est non-AA >60 03/02/2017 11:14 AM    Calcium 9.1 03/02/2017 11:14 AM    Magnesium 2.5 02/13/2017 02:10 PM    Albumin 2.9 03/02/2017 11:14 AM    Protein, total 7.5 03/02/2017 11:14 AM    Globulin 4.6 03/02/2017 11:14 AM    A-G Ratio 0.6 03/02/2017 11:14 AM    AST (SGOT) 17 03/02/2017 11:14 AM    ALT (SGPT) 15 03/02/2017 11:14 AM     Lab Results   Component Value Date/Time    WBC 2.1 03/02/2017 11:14 AM    HGB 12.3 03/02/2017 11:14 AM    HCT 37.1 03/02/2017 11:14 AM    PLATELET 800 03/02/2017 11:14 AM       ASSESSMENT and PLAN:  Mechelle Nolasco is tolerating radiation as anticipated for the current dose and fraction. We will continue on as planned with with treatment to be completed Wednesday. Will plan for 1 month follow up.        Char Jean Baptiste MD   March 13, 2017

## 2017-03-14 ENCOUNTER — HOSPITAL ENCOUNTER (OUTPATIENT)
Dept: RADIATION ONCOLOGY | Age: 72
Discharge: HOME | End: 2017-03-14
Payer: MEDICARE

## 2017-03-14 ENCOUNTER — HOSPITAL ENCOUNTER (OUTPATIENT)
Dept: LAB | Age: 72
Discharge: HOME | End: 2017-03-14
Payer: MEDICARE

## 2017-03-14 DIAGNOSIS — C18.9 COLON CARCINOMA (HCC): ICD-10-CM

## 2017-03-14 LAB
ALBUMIN SERPL BCP-MCNC: 2.7 G/DL (ref 3.2–4.6)
ALBUMIN/GLOB SERPL: 0.6 {RATIO} (ref 1.2–3.5)
ALP SERPL-CCNC: 120 U/L (ref 50–136)
ALT SERPL-CCNC: 15 U/L (ref 12–65)
ANION GAP BLD CALC-SCNC: 6 MMOL/L (ref 7–16)
AST SERPL W P-5'-P-CCNC: 25 U/L (ref 15–37)
BASOPHILS # BLD AUTO: 0.1 K/UL (ref 0–0.2)
BASOPHILS # BLD: 2 % (ref 0–2)
BILIRUB SERPL-MCNC: 0.3 MG/DL (ref 0.2–1.1)
BUN SERPL-MCNC: 15 MG/DL (ref 8–23)
CALCIUM SERPL-MCNC: 8.9 MG/DL (ref 8.3–10.4)
CHLORIDE SERPL-SCNC: 104 MMOL/L (ref 98–107)
CO2 SERPL-SCNC: 32 MMOL/L (ref 23–32)
CREAT SERPL-MCNC: 0.87 MG/DL (ref 0.8–1.5)
DIFFERENTIAL METHOD BLD: ABNORMAL
EOSINOPHIL # BLD: 0.3 K/UL (ref 0–0.8)
EOSINOPHIL NFR BLD: 11 % (ref 0.5–7.8)
ERYTHROCYTE [DISTWIDTH] IN BLOOD BY AUTOMATED COUNT: 19.8 % (ref 11.9–14.6)
GLOBULIN SER CALC-MCNC: 4.4 G/DL (ref 2.3–3.5)
GLUCOSE SERPL-MCNC: 115 MG/DL (ref 65–100)
HCT VFR BLD AUTO: 37.4 % (ref 41.1–50.3)
HGB BLD-MCNC: 12.3 G/DL (ref 13.6–17.2)
LYMPHOCYTES # BLD AUTO: 24 % (ref 13–44)
LYMPHOCYTES # BLD: 0.6 K/UL (ref 0.5–4.6)
MAGNESIUM SERPL-MCNC: 2.4 MG/DL (ref 1.8–2.4)
MCH RBC QN AUTO: 31.6 PG (ref 26.1–32.9)
MCHC RBC AUTO-ENTMCNC: 32.9 G/DL (ref 31.4–35)
MCV RBC AUTO: 96.1 FL (ref 79.6–97.8)
MONOCYTES # BLD: 0.4 K/UL (ref 0.1–1.3)
MONOCYTES NFR BLD AUTO: 14 % (ref 4–12)
NEUTS SEG # BLD: 1.2 K/UL (ref 1.7–8.2)
NEUTS SEG NFR BLD AUTO: 49 % (ref 43–78)
NRBC # BLD: 0 K/UL (ref 0–0.2)
NRBC BLD-RTO: 0 PER 100 WBC (ref 0–2)
PLATELET # BLD AUTO: 308 K/UL (ref 150–450)
PLATELET COMMENTS,PCOM: ADEQUATE
PMV BLD AUTO: 8.7 FL (ref 10.8–14.1)
POTASSIUM SERPL-SCNC: 3.8 MMOL/L (ref 3.5–5.1)
PROT SERPL-MCNC: 7.1 G/DL (ref 6.3–8.2)
RBC # BLD AUTO: 3.89 M/UL (ref 4.23–5.67)
RBC MORPH BLD: ABNORMAL
RBC MORPH BLD: ABNORMAL
SODIUM SERPL-SCNC: 142 MMOL/L (ref 136–145)
WBC # BLD AUTO: 2.6 K/UL (ref 4.3–11.1)
WBC MORPH BLD: ABNORMAL

## 2017-03-14 PROCEDURE — 77386 HC IMRT TRMT DLVR COMPL: CPT

## 2017-03-14 PROCEDURE — 80053 COMPREHEN METABOLIC PANEL: CPT | Performed by: INTERNAL MEDICINE

## 2017-03-14 PROCEDURE — 83735 ASSAY OF MAGNESIUM: CPT | Performed by: INTERNAL MEDICINE

## 2017-03-14 PROCEDURE — 85025 COMPLETE CBC W/AUTO DIFF WBC: CPT | Performed by: INTERNAL MEDICINE

## 2017-03-14 PROCEDURE — 36415 COLL VENOUS BLD VENIPUNCTURE: CPT | Performed by: INTERNAL MEDICINE

## 2017-03-15 ENCOUNTER — HOSPITAL ENCOUNTER (OUTPATIENT)
Dept: RADIATION ONCOLOGY | Age: 72
Discharge: HOME | End: 2017-03-15
Payer: MEDICARE

## 2017-03-15 ENCOUNTER — HOSPITAL ENCOUNTER (OUTPATIENT)
Dept: LAB | Age: 72
Discharge: HOME | End: 2017-03-15
Payer: MEDICARE

## 2017-03-15 DIAGNOSIS — C44.509 CARCINOMA OF ABDOMINAL WALL: ICD-10-CM

## 2017-03-15 LAB
APTT PPP: 28.5 SEC (ref 23.5–31.7)
INR PPP: 1.1 (ref 0.9–1.2)
PROTHROMBIN TIME: 11.2 SEC (ref 9.6–12)

## 2017-03-15 PROCEDURE — 85610 PROTHROMBIN TIME: CPT | Performed by: INTERNAL MEDICINE

## 2017-03-15 PROCEDURE — 36415 COLL VENOUS BLD VENIPUNCTURE: CPT | Performed by: INTERNAL MEDICINE

## 2017-03-15 PROCEDURE — 77336 RADIATION PHYSICS CONSULT: CPT

## 2017-03-15 PROCEDURE — 77386 HC IMRT TRMT DLVR COMPL: CPT

## 2017-03-15 PROCEDURE — 85730 THROMBOPLASTIN TIME PARTIAL: CPT | Performed by: INTERNAL MEDICINE

## 2017-03-20 ENCOUNTER — DOCUMENTATION ONLY (OUTPATIENT)
Dept: HEMATOLOGY | Age: 72
End: 2017-03-20

## 2017-03-20 NOTE — PROGRESS NOTES
I spoke with Philip Pickett regarding his [facility] insurance coverage. Patient has a $ 6,200 OOP Max with $0776.97 remaining. Mr. Thomas Bates has concerns about   the cost of treatment. He stated that he lives on a fixed income. Explained to Mr. Thomas Bates that the grants are fully allocated that correlate with his diagnosis. Encouraged   him to apply for financial assistance through the hospital.               Next, I spoke with patient regarding potential oral medication authorizations. I told him that if he ever had any problems getting his oral medications filled to give the dedicated              [address] RickyBrandan López  Miguel Andrews a call. Most of the time, it is simply an authorization that needs to be done with the insurance company. Next, I spoke with patient regarding enrolling with [employer]. I went over some of the services that [employer] offers and the enrollment process.  He declined and stated   that they have been no help to him in the past.    Lastly, I gave patient a form with various resource organizations that could assist with specific needs (example:  transportation, lodging, preparing meals, home cleaning)

## 2017-03-23 ENCOUNTER — ANESTHESIA EVENT (OUTPATIENT)
Dept: SURGERY | Age: 72
End: 2017-03-23
Payer: MEDICARE

## 2017-03-23 RX ORDER — SODIUM CHLORIDE 0.9 % (FLUSH) 0.9 %
5-10 SYRINGE (ML) INJECTION AS NEEDED
Status: CANCELLED | OUTPATIENT
Start: 2017-03-23

## 2017-03-23 RX ORDER — HYDROCODONE BITARTRATE AND ACETAMINOPHEN 7.5; 325 MG/1; MG/1
1 TABLET ORAL AS NEEDED
Status: CANCELLED | OUTPATIENT
Start: 2017-03-23

## 2017-03-23 RX ORDER — NALOXONE HYDROCHLORIDE 0.4 MG/ML
0.1 INJECTION, SOLUTION INTRAMUSCULAR; INTRAVENOUS; SUBCUTANEOUS AS NEEDED
Status: CANCELLED | OUTPATIENT
Start: 2017-03-23

## 2017-03-23 RX ORDER — SODIUM CHLORIDE 9 MG/ML
25 INJECTION, SOLUTION INTRAVENOUS CONTINUOUS
Status: CANCELLED | OUTPATIENT
Start: 2017-03-23 | End: 2017-03-24

## 2017-03-23 RX ORDER — MIDAZOLAM HYDROCHLORIDE 1 MG/ML
2 INJECTION, SOLUTION INTRAMUSCULAR; INTRAVENOUS
Status: CANCELLED | OUTPATIENT
Start: 2017-03-23

## 2017-03-23 RX ORDER — LIDOCAINE HYDROCHLORIDE 10 MG/ML
0.1 INJECTION INFILTRATION; PERINEURAL AS NEEDED
Status: CANCELLED | OUTPATIENT
Start: 2017-03-23

## 2017-03-23 RX ORDER — FAMOTIDINE 20 MG/1
20 TABLET, FILM COATED ORAL ONCE
Status: CANCELLED | OUTPATIENT
Start: 2017-03-23 | End: 2017-03-23

## 2017-03-23 RX ORDER — OXYCODONE HYDROCHLORIDE 5 MG/1
5 TABLET ORAL
Status: CANCELLED | OUTPATIENT
Start: 2017-03-23

## 2017-03-23 RX ORDER — HYDROMORPHONE HYDROCHLORIDE 2 MG/ML
0.5 INJECTION, SOLUTION INTRAMUSCULAR; INTRAVENOUS; SUBCUTANEOUS
Status: CANCELLED | OUTPATIENT
Start: 2017-03-23

## 2017-03-23 RX ORDER — SODIUM CHLORIDE 0.9 % (FLUSH) 0.9 %
5-10 SYRINGE (ML) INJECTION EVERY 8 HOURS
Status: CANCELLED | OUTPATIENT
Start: 2017-03-23

## 2017-03-23 RX ORDER — SODIUM CHLORIDE, SODIUM LACTATE, POTASSIUM CHLORIDE, CALCIUM CHLORIDE 600; 310; 30; 20 MG/100ML; MG/100ML; MG/100ML; MG/100ML
100 INJECTION, SOLUTION INTRAVENOUS CONTINUOUS
Status: CANCELLED | OUTPATIENT
Start: 2017-03-23 | End: 2017-03-24

## 2017-03-23 RX ORDER — FENTANYL CITRATE 50 UG/ML
100 INJECTION, SOLUTION INTRAMUSCULAR; INTRAVENOUS ONCE
Status: CANCELLED | OUTPATIENT
Start: 2017-03-23 | End: 2017-03-23

## 2017-03-24 ENCOUNTER — ANESTHESIA (OUTPATIENT)
Dept: SURGERY | Age: 72
End: 2017-03-24
Payer: MEDICARE

## 2017-03-24 ENCOUNTER — HOSPITAL ENCOUNTER (OUTPATIENT)
Dept: INTERVENTIONAL RADIOLOGY/VASCULAR | Age: 72
Discharge: HOME | End: 2017-03-24
Attending: INTERNAL MEDICINE
Payer: MEDICARE

## 2017-03-24 ENCOUNTER — HOSPITAL ENCOUNTER (OUTPATIENT)
Age: 72
Setting detail: OUTPATIENT SURGERY
Discharge: HOME | End: 2017-03-24
Attending: RADIOLOGY | Admitting: RADIOLOGY
Payer: MEDICARE

## 2017-03-24 VITALS
HEART RATE: 93 BPM | SYSTOLIC BLOOD PRESSURE: 88 MMHG | OXYGEN SATURATION: 95 % | TEMPERATURE: 98 F | DIASTOLIC BLOOD PRESSURE: 52 MMHG | RESPIRATION RATE: 16 BRPM

## 2017-03-24 VITALS
RESPIRATION RATE: 17 BRPM | HEART RATE: 89 BPM | TEMPERATURE: 97.6 F | OXYGEN SATURATION: 94 % | SYSTOLIC BLOOD PRESSURE: 107 MMHG | DIASTOLIC BLOOD PRESSURE: 56 MMHG

## 2017-03-24 DIAGNOSIS — C44.509 CARCINOMA OF ABDOMINAL WALL: ICD-10-CM

## 2017-03-24 PROCEDURE — C1894 INTRO/SHEATH, NON-LASER: HCPCS

## 2017-03-24 PROCEDURE — 74011000250 HC RX REV CODE- 250

## 2017-03-24 PROCEDURE — 77030031131 HC SUT MXN P COVD -B

## 2017-03-24 PROCEDURE — C1788 PORT, INDWELLING, IMP: HCPCS

## 2017-03-24 PROCEDURE — 76060000032 HC ANESTHESIA 0.5 TO 1 HR: Performed by: RADIOLOGY

## 2017-03-24 PROCEDURE — 77030031139 HC SUT VCRL2 J&J -A

## 2017-03-24 PROCEDURE — 74011250636 HC RX REV CODE- 250/636

## 2017-03-24 PROCEDURE — 77030010507 HC ADH SKN DERMBND J&J -B

## 2017-03-24 PROCEDURE — 36561 INSERT TUNNELED CV CATH: CPT

## 2017-03-24 PROCEDURE — 74011250636 HC RX REV CODE- 250/636: Performed by: PHYSICIAN ASSISTANT

## 2017-03-24 PROCEDURE — 74011000250 HC RX REV CODE- 250: Performed by: PHYSICIAN ASSISTANT

## 2017-03-24 RX ORDER — PROPOFOL 10 MG/ML
INJECTION, EMULSION INTRAVENOUS
Status: DISCONTINUED | OUTPATIENT
Start: 2017-03-24 | End: 2017-03-24 | Stop reason: HOSPADM

## 2017-03-24 RX ORDER — CEFAZOLIN SODIUM IN 0.9 % NACL 2 G/50 ML
INTRAVENOUS SOLUTION, PIGGYBACK (ML) INTRAVENOUS AS NEEDED
Status: DISCONTINUED | OUTPATIENT
Start: 2017-03-24 | End: 2017-03-24 | Stop reason: HOSPADM

## 2017-03-24 RX ORDER — LIDOCAINE HYDROCHLORIDE 20 MG/ML
INJECTION, SOLUTION EPIDURAL; INFILTRATION; INTRACAUDAL; PERINEURAL AS NEEDED
Status: DISCONTINUED | OUTPATIENT
Start: 2017-03-24 | End: 2017-03-24 | Stop reason: HOSPADM

## 2017-03-24 RX ORDER — HEPARIN SODIUM (PORCINE) LOCK FLUSH IV SOLN 100 UNIT/ML 100 UNIT/ML
500 SOLUTION INTRAVENOUS ONCE
Status: COMPLETED | OUTPATIENT
Start: 2017-03-24 | End: 2017-03-24

## 2017-03-24 RX ORDER — HEPARIN SODIUM (PORCINE) LOCK FLUSH IV SOLN 100 UNIT/ML 100 UNIT/ML
300 SOLUTION INTRAVENOUS AS NEEDED
Status: DISCONTINUED | OUTPATIENT
Start: 2017-03-24 | End: 2017-03-28 | Stop reason: HOSPADM

## 2017-03-24 RX ORDER — LIDOCAINE HYDROCHLORIDE AND EPINEPHRINE 20; 10 MG/ML; UG/ML
10-20 INJECTION, SOLUTION INFILTRATION; PERINEURAL ONCE
Status: COMPLETED | OUTPATIENT
Start: 2017-03-24 | End: 2017-03-24

## 2017-03-24 RX ORDER — LIDOCAINE HYDROCHLORIDE 20 MG/ML
.5-2 INJECTION, SOLUTION INFILTRATION; PERINEURAL ONCE
Status: COMPLETED | OUTPATIENT
Start: 2017-03-24 | End: 2017-03-24

## 2017-03-24 RX ORDER — SODIUM CHLORIDE, SODIUM LACTATE, POTASSIUM CHLORIDE, CALCIUM CHLORIDE 600; 310; 30; 20 MG/100ML; MG/100ML; MG/100ML; MG/100ML
INJECTION, SOLUTION INTRAVENOUS
Status: DISCONTINUED | OUTPATIENT
Start: 2017-03-24 | End: 2017-03-24 | Stop reason: HOSPADM

## 2017-03-24 RX ORDER — HEPARIN SODIUM 200 [USP'U]/100ML
1000 INJECTION, SOLUTION INTRAVENOUS CONTINUOUS
Status: DISCONTINUED | OUTPATIENT
Start: 2017-03-24 | End: 2017-03-28 | Stop reason: HOSPADM

## 2017-03-24 RX ORDER — PROPOFOL 10 MG/ML
INJECTION, EMULSION INTRAVENOUS AS NEEDED
Status: DISCONTINUED | OUTPATIENT
Start: 2017-03-24 | End: 2017-03-24 | Stop reason: HOSPADM

## 2017-03-24 RX ADMIN — LIDOCAINE HYDROCHLORIDE 160 MG: 20 INJECTION, SOLUTION INFILTRATION; PERINEURAL at 11:20

## 2017-03-24 RX ADMIN — LIDOCAINE HYDROCHLORIDE 100 MG: 20 INJECTION, SOLUTION EPIDURAL; INFILTRATION; INTRACAUDAL; PERINEURAL at 11:04

## 2017-03-24 RX ADMIN — Medication 2 G: at 11:14

## 2017-03-24 RX ADMIN — LIDOCAINE HYDROCHLORIDE,EPINEPHRINE BITARTRATE 200 MG: 20; .01 INJECTION, SOLUTION INFILTRATION; PERINEURAL at 11:23

## 2017-03-24 RX ADMIN — SODIUM CHLORIDE, SODIUM LACTATE, POTASSIUM CHLORIDE, CALCIUM CHLORIDE: 600; 310; 30; 20 INJECTION, SOLUTION INTRAVENOUS at 10:59

## 2017-03-24 RX ADMIN — HEPARIN SODIUM 2000 UNITS: 200 INJECTION, SOLUTION INTRAVENOUS at 11:21

## 2017-03-24 RX ADMIN — HEPARIN SODIUM (PORCINE) LOCK FLUSH IV SOLN 100 UNIT/ML 500 UNITS: 100 SOLUTION at 11:27

## 2017-03-24 RX ADMIN — PROPOFOL 140 MCG/KG/MIN: 10 INJECTION, EMULSION INTRAVENOUS at 11:04

## 2017-03-24 RX ADMIN — PROPOFOL 40 MG: 10 INJECTION, EMULSION INTRAVENOUS at 11:04

## 2017-03-24 NOTE — PROGRESS NOTES
Interventional Radiology Prep Area Handoff      No Known Allergies    IRSummary reviewed. Pt identified with two identifiers. Verified procedure with Patient and IR Provider as port placement. Consent obtained: yes H&P complete/updated: yes MD airway complete: anesthesia    Sedation:anesthesia   NPO: yes   Anticoagulation: stopped Eliquis Tuesday     Pt shaved: no   Antibiotics: ancef 2gm  Anesthesia notified: yes    Additional Notes: none      Lines:  Peripherally Inserted Central Catheter:     Central Venous Catheter:     Venous Access Device:     Peripheral Intravenous Line:  Peripheral IV 03/24/17 Left Forearm (Active)   Site Assessment Clean, dry, & intact 3/24/2017 10:14 AM   Phlebitis Assessment 0 3/24/2017 10:14 AM   Infiltration Assessment 0 3/24/2017 10:14 AM   Dressing Type Transparent 3/24/2017 10:14 AM   Hub Color/Line Status Pink 3/24/2017 10:14 AM     Hemodialysis Catheter:     Drain(s):  Joseluis-Black Drain 12/13/16 Mid;Right Abdomen (Active)       Labs verified: yes  No results found for this or any previous visit (from the past 24 hour(s)).   Patient Vitals for the past 8 hrs:   Temp Pulse Resp BP SpO2   03/24/17 1011 98.1 °F (36.7 °C) 84 16 125/71 98 %

## 2017-03-24 NOTE — ANESTHESIA POSTPROCEDURE EVALUATION
Post-Anesthesia Evaluation and Assessment    Patient: Dotty Brower MRN: [id number]  SSN: [id number]    YOB: 1945  Age: 80 y.o. Sex: male       Cardiovascular Function/Vital Signs  Visit Vitals    BP (!) 88/52    Pulse 93    Temp 36.7 °C (98 °F)    Resp 16    SpO2 95%       Patient is status post total IV anesthesia anesthesia for Procedure(s):  PORT PLACEMENT / Jennifer Nares. Nausea/Vomiting: None    Postoperative hydration reviewed and adequate. Pain:      Managed    Neurological Status: At baseline    Mental Status and Level of Consciousness: Arousable    Pulmonary Status:   O2 Device: Nasal cannula (03/24/17 1138)   Adequate oxygenation and airway patent    Complications related to anesthesia: None    Post-anesthesia assessment completed.  No concerns    Signed By: Belem Willson MD     March 24, 2017

## 2017-03-24 NOTE — DISCHARGE INSTRUCTIONS
[address]  Department of Interventional Radiology  [facility]  [phone] Office  [phone] Fax  Implanted Port Discharge Instructions      General Instructions:   A port is like an implanted IV. They are usually ordered for patients who will be getting chemotherapy, but can also be used as an IV for long term antibiotics, large amounts of fluids, and/or blood products. Your blood can be drawn from your port for labs also. Those patients who do not have good veins find the ports convenient as they can get the IV they need with one stick. The port can be used long term, and the care is easy. The device is under the skin, and once the skin heals, care is minimal. All that is required is the nurse who accesses the port will need to flush it with heparinized saline after each use. Ports are usually placed in the chest wall, usually on the right side. But they can be place in the arms and in the abdomen. Home Care Instructions: If your port is in your arm, do not allow blood pressure or other IVs to be place in that arm. Do not allow bra straps or any clothing to rub the skin over the port. Do not bathe or swim until the skin has healed and if the port is accessed. Once it is healed, and when the port is not accessed, it is okay to bathe and swim. Restrict yourself to light activity for the first 5 days after getting the port put in, after that, resume normal activity slowly. You may resume your normal diet and medications. Follow-Up Instructions: Please see your oncologist, or whatever physician ordered the port as he/she has requested of you. Call If: You should call your Physician and/or the Radiology Nurse if you notice redness, pus, swelling, or pain from the area of your incision. Call if you should develop a fever. The nurses who access your port will know to call your doctor if the port does not seem to be working properly. You need to tell the nurses who use the port if you should have any pain or swelling at the site during an infusion. To Reach Us: If you have any questions about your procedure, please call the Interventional Radiology department at [phone]. After business hours (5pm) and weekends, call the answering service at [phone] and ask for the Radiologist on call to be paged. Interventional Radiology General Nurse Discharge    After general anesthesia or intravenous sedation, for 24 hours or while taking prescription Narcotics:  · Limit your activities  · Do not drive and operate hazardous machinery  · Do not make important personal or business decisions  · Do  not drink alcoholic beverages  · If you have not urinated within 8 hours after discharge, please contact your surgeon on call. * Please give a list of your current medications to your Primary Care Provider. * Please update this list whenever your medications are discontinued, doses are     changed, or new medications (including over-the-counter products) are added. * Please carry medication information at all times in case of emergency situations. These are general instructions for a healthy lifestyle:    No smoking/ No tobacco products/ Avoid exposure to second hand smoke  Surgeon General's Warning:  Quitting smoking now greatly reduces serious risk to your health. Obesity, smoking, and sedentary lifestyle greatly increases your risk for illness  A healthy diet, regular physical exercise & weight monitoring are important for maintaining a healthy lifestyle    You may be retaining fluid if you have a history of heart failure or if you experience any of the following symptoms:  Weight gain of 3 pounds or more overnight or 5 pounds in a week, increased swelling in our hands or feet or shortness of breath while lying flat in bed.   Please call your doctor as soon as you notice any of these symptoms; do not wait until your next office visit. Recognize signs and symptoms of STROKE:  F-face looks uneven    A-arms unable to move or move unevenly    S-speech slurred or non-existent    T-time-call 911 as soon as signs and symptoms begin-DO NOT go       Back to bed or wait to see if you get better-TIME IS BRAIN.           Patient Signature:  Date: 3/24/2017  Discharging Nurse: Allen Knox

## 2017-03-24 NOTE — IP AVS SNAPSHOT
[address] 
[phone] Patient: Radha Simmons MRN: [id number] :1945 You are allergic to the following No active allergies Recent Documentation Smoking Status Former Smoker Emergency Contacts Name Discharge Info Relation Home Work Mobile Luci Garcia  Spouse [3] [phone] [address] [facility]  Son [22] [phone] About your hospitalization You were admitted on:  2017 You last received care in the:  [facility] You were discharged on:  2017 Unit phone number:  [phone] Why you were hospitalized Your primary diagnosis was:  Not on File Providers Seen During Your Hospitalizations Provider Role Specialty Primary office phone Osmin Argueta MD Attending Provider Hematology [phone] Your Primary Care Physician (PCP) Primary Care Physician Office Phone Office Fax Carl Black [phone] Follow-up Information Follow up With Details Comments Contact Info Vikas Diaz MD   [address] [address] 
[phone] Your Appointments 2017  8:45 AM EDT  
LAB with [address]  
[address] [facility] OUTREACH INSURANCE [facility] [place]key [place] [id number] [address] [place] [address]  
[phone] 2017  9:15 AM EDT PreChemo Follow Up with Lyndol Pike, MD Romayne Duster Hematology and Oncology [facility]) RONAN/ Jose Luis Rehman [address] [place] [address]  
[phone] 2017  9:15 AM EDT Follow Up with Sharan Batter, RD Romayne Duster Hematology and Oncology [facility]) RONAN/ Jose Luis Rehman [address] [place] [address]  
[phone] Tuesday April 18, 2017 10:00 AM EDT Chemo with Izaiah Lynne [facility]) [address] Giovanna Layne [phone] [facility] [address]  
[phone] [address] Wednesday April 19, 2017 10:30 AM EDT  
[facility] OFFICE VISIT with Ivana Becker NP  
[address] [facility]) [address] [address] [facility] [address]  
[phone] Thursday April 20, 2017  2:00 PM EDT Chemo with Shirlean Libman. [address] ([facility]) [address] Giovanna Layne [phone] [facility] [address]  
[phone] [address] Current Discharge Medication List  
  
Notice This visit is during an admission. Changes to the med list made in this visit will be reflected in the After Visit Summary of the admission. Discharge Instructions [address] Department of Interventional Radiology [facility] 
[phone] Office 
[phone] Fax Implanted [facility] Discharge Instructions General Instructions: 
 A port is like an implanted IV. They are usually ordered for patients who will be getting chemotherapy, but can also be used as an IV for long term antibiotics, large amounts of fluids, and/or blood products. Your blood can be drawn from your port for labs also. Those patients who do not have good veins find the ports convenient as they can get the IV they need with one stick. The port can be used long term, and the care is easy. The device is under the skin, and once the skin heals, care is minimal. All that is required is the nurse who accesses the port will need to flush it with heparinized saline after each use.  Ports are usually placed in the chest wall, usually on the right side. But they can be place in the arms and in the abdomen. Home Care Instructions: If your port is in your arm, do not allow blood pressure or other IVs to be place in that arm. Do not allow bra straps or any clothing to rub the skin over the port. Do not bathe or swim until the skin has healed and if the port is accessed. Once it is healed, and when the port is not accessed, it is okay to bathe and swim. Restrict yourself to light activity for the first 5 days after getting the port put in, after that, resume normal activity slowly. You may resume your normal diet and medications. Follow-Up Instructions: Please see your oncologist, or whatever physician ordered the port as he/she has requested of you. Call If: You should call your Physician and/or the Radiology Nurse if you notice redness, pus, swelling, or pain from the area of your incision. Call if you should develop a fever. The nurses who access your port will know to call your doctor if the port does not seem to be working properly. You need to tell the nurses who use the port if you should have any pain or swelling at the site during an infusion. To Reach Us: If you have any questions about your procedure, please call the Interventional Radiology department at [phone]. After business hours (5pm) and weekends, call the answering service at [phone] and ask for the Radiologist on call to be paged. Interventional Radiology General Nurse Discharge After general anesthesia or intravenous sedation, for 24 hours or while taking prescription Narcotics: · Limit your activities · Do not drive and operate hazardous machinery · Do not make important personal or business decisions · Do  not drink alcoholic beverages · If you have not urinated within 8 hours after discharge, please contact your surgeon on call. * Please give a list of your current medications to your Primary Care Provider. * Please update this list whenever your medications are discontinued, doses are 
   changed, or new medications (including over-the-counter products) are added. * Please carry medication information at all times in case of emergency situations. These are general instructions for a healthy lifestyle: No smoking/ No tobacco products/ Avoid exposure to second hand smoke Surgeon General's Warning:  Quitting smoking now greatly reduces serious risk to your health. Obesity, smoking, and sedentary lifestyle greatly increases your risk for illness A healthy diet, regular physical exercise & weight monitoring are important for maintaining a healthy lifestyle You may be retaining fluid if you have a history of heart failure or if you experience any of the following symptoms:  Weight gain of 3 pounds or more overnight or 5 pounds in a week, increased swelling in our hands or feet or shortness of breath while lying flat in bed. Please call your doctor as soon as you notice any of these symptoms; do not wait until your next office visit. Recognize signs and symptoms of STROKE: 
F-face looks uneven A-arms unable to move or move unevenly S-speech slurred or non-existent T-time-call 911 as soon as signs and symptoms begin-DO NOT go Back to bed or wait to see if you get better-TIME IS BRAIN. Patient Signature: 
Date: 3/24/2017 Discharging Nurse: Danyell Muñiz Discharge Orders None Introducing [facility]! Kalen Guerra introduces [employer] patient portal. Now you can access parts of your medical record, email your doctor's office, and request medication refills online. 1. In your internet browser, go to [email or web address] 2. Click on the First Time User? Click Here link in the Sign In box. You will see the New Member Sign Up page. 3. Enter your [employer] Access Code exactly as it appears below.  You will not need to use this code after youve completed the sign-up process. If you do not sign up before the expiration date, you must request a new code. · [employer] Access Code: [email or web address] Expires: 4/25/2017  5:01 PM 
 
4. Enter the last four digits of your Social Security Number (xxxx) and Date of Birth (mm/dd/yyyy) as indicated and click Submit. You will be taken to the next sign-up page. 5. Create a [employer] ID. This will be your [employer] login ID and cannot be changed, so think of one that is secure and easy to remember. 6. Create a [employer] password. You can change your password at any time. 7. Enter your Password Reset Question and Answer. This can be used at a later time if you forget your password. 8. Enter your e-mail address. You will receive e-mail notification when new information is available in [address] 9. Click Sign Up. You can now view and download portions of your medical record. 10. Click the Download Summary menu link to download a portable copy of your medical information. If you have questions, please visit the Frequently Asked Questions section of the [employer] website. Remember, [employer] is NOT to be used for urgent needs. For medical emergencies, dial 911. Now available from your iPhone and Android! General Information Please provide this summary of care documentation to your next provider. Patient Signature:  ____________________________________________________________ Date:  ____________________________________________________________  
  
Sallie Ortega Provider Signature:  ____________________________________________________________ Date:  ____________________________________________________________

## 2017-03-24 NOTE — IP AVS SNAPSHOT
Current Discharge Medication List  
  
Notice This visit is during an admission. Changes to the med list made in this visit will be reflected in the After Visit Summary of the admission.

## 2017-03-24 NOTE — PROCEDURES
Department of Interventional Radiology  [phone]        Interventional Radiology Brief Procedure Note    Patient: Anabella Rodarte MRN: [id number]  SSN: [id number]    YOB: 1945  Age: 80 y.o.   Sex: male      Date of Procedure: 3/24/2017    Pre-Procedure Diagnosis: colon cancer    Post-Procedure Diagnosis: SAME    Procedure(s): Venous Chest Port Placement    Brief Description of Procedure: US, fluoro guided right IJ venous chest port placed    Performed By: Louis Orr PA-C     Assistants: None    Anesthesia: MAC    Estimated Blood Loss: Less than 10ml    Specimens: None    Implants: Subcutaneous Port    Findings: right IJ patent    Complications: None    Recommendations: ok to use port     Follow Up: referring MD    Signed By: Louis Orr PA-C     March 24, 2017

## 2017-03-24 NOTE — ANESTHESIA PREPROCEDURE EVALUATION
Anesthetic History               Review of Systems / Medical History  Patient summary reviewed    Pulmonary    COPD: mild      Smoker (Stopped 11/16)         Neuro/Psych              Cardiovascular                  Exercise tolerance: >4 METS     GI/Hepatic/Renal                Endo/Other        Cancer (Colon)     Other Findings              Physical Exam    Airway  Mallampati: II  TM Distance: > 6 cm  Neck ROM: normal range of motion   Mouth opening: Normal     Cardiovascular  Regular rate and rhythm,  S1 and S2 normal,  no murmur, click, rub, or gallop             Dental    Dentition: Edentulous     Pulmonary  Breath sounds clear to auscultation               Abdominal         Other Findings            Anesthetic Plan    ASA: 3  Anesthesia type: total IV anesthesia            Anesthetic plan and risks discussed with: Patient

## 2017-03-24 NOTE — PROGRESS NOTES
TRANSFER - OUT REPORT:    Verbal report given to Nora Nelson RN(name) on Timo Lloyd for routine post - op       Report consisted of patients Situation, Background, Assessment and   Recommendations(SBAR). Information from the following report(s) Procedure Summary and MAR was reviewed with the receiving nurse. Lines:   Venous Access Device 03/24/17 Upper chest (subclavicular area, right (Active)       Peripheral IV 03/24/17 Left Forearm (Active)   Site Assessment Clean, dry, & intact 3/24/2017 11:24 AM   Phlebitis Assessment 0 3/24/2017 10:14 AM   Infiltration Assessment 0 3/24/2017 10:14 AM   Dressing Type Topical skin adhesive 3/24/2017 11:24 AM   Hub Color/Line Status Pink 3/24/2017 10:14 AM        Opportunity for questions and clarification was provided.

## 2017-03-24 NOTE — H&P
Department of Interventional Radiology  [phone]    History and Physical    Patient:  Tonia Zacarias MRN:  [id number]  SSN:  [id number]    YOB: 1945  Age:  80 y.o. Sex:  male      Primary Care Provider:  Kathryn Liang MD  Referring Physician:  Napoleon Nair MD    Subjective:     Chief Complaint: port    History of the Present Illness: The patient is a 80 y.o. male who presents for venous chest port placement. Colon cancer. Surgery 12/2016, recovered well. NPO. eliquis held-post op PE. Past Medical History:   Diagnosis Date    Anxiety and depression     manage well with Klonopin    Cancer ([address]     tumor removed from colon    History of staph infection 2013    to R hand- treated    Long term (current) use of anticoagulants     Eliquis    Prostate hypertrophy     Pulmonary embolism (HCC)     hx of- started on Eliquis    Smoker     1 pk/day x since age 26    Unspecified disorder of intestine      Past Surgical History:   Procedure Laterality Date    HX TONSIL AND ADENOIDECTOMY  at age 4   [de-identified] TUMOR REMOVAL      removed from colon        Review of Systems:    Pertinent items are noted in the History of Present Illness. Current Facility-Administered Medications   Medication Dose Route Frequency    lidocaine (XYLOCAINE) 20 mg/mL (2 %) injection  mg  0.5-20 mL IntraDERMal ONCE    lidocaine-EPINEPHrine (XYLOCAINE) 2 %-1:100,000 injection 200-400 mg  10-20 mL IntraDERMal ONCE    heparin (PF) 2 units/ml in NS infusion 2,000 Units  1,000 mL Irrigation CONTINUOUS    heparin (porcine) 100 unit/mL injection 500 Units  500 Units InterCATHeter ONCE     No current outpatient prescriptions on file.         No Known Allergies    Family History   Problem Relation Age of Onset    Cancer Mother      Pancreatic and Liver    No Known Problems Father     Heart Disease Brother     Heart Attack Brother     No Known Problems Brother      Social History   Substance Use Topics  Smoking status: Former Smoker     Packs/day: 2.00     Years: 50.00     Types: Cigarettes     Quit date: 12/13/2016    Smokeless tobacco: Never Used    Alcohol use No        Objective:       Physical Examination:    Vitals:    03/24/17 1011   BP: 125/71   Pulse: 84   Resp: 16   Temp: 98.1 °F (36.7 °C)   SpO2: 98%     Blood pressure 125/71, pulse 84, temperature 98.1 °F (36.7 °C), resp. rate 16, SpO2 98 %.   HEART: regular rate and rhythm  LUNG: clear to auscultation bilaterally  ABDOMEN: normal findings: soft, nontender  EXTREMITIES: normal strength, tone, and muscle mass    Laboratory:     Lab Results   Component Value Date/Time    Sodium 142 03/14/2017 12:28 PM    Sodium 141 03/02/2017 11:14 AM    Potassium 3.8 03/14/2017 12:28 PM    Potassium 3.7 03/02/2017 11:14 AM    Chloride 104 03/14/2017 12:28 PM    Chloride 105 03/02/2017 11:14 AM    CO2 32 03/14/2017 12:28 PM    CO2 30 03/02/2017 11:14 AM    Anion gap 6 03/14/2017 12:28 PM    Anion gap 6 03/02/2017 11:14 AM    Glucose 115 03/14/2017 12:28 PM    Glucose 103 03/02/2017 11:14 AM    BUN 15 03/14/2017 12:28 PM    BUN 14 03/02/2017 11:14 AM    Creatinine 0.87 03/14/2017 12:28 PM    Creatinine 0.77 03/02/2017 11:14 AM    GFR est AA >60 03/14/2017 12:28 PM    GFR est AA >60 03/02/2017 11:14 AM    GFR est non-AA >60 03/14/2017 12:28 PM    GFR est non-AA >60 03/02/2017 11:14 AM    Calcium 8.9 03/14/2017 12:28 PM    Calcium 9.1 03/02/2017 11:14 AM    Magnesium 2.4 03/14/2017 12:28 PM    Magnesium 2.5 02/13/2017 02:10 PM    Albumin 2.7 03/14/2017 12:28 PM    Albumin 2.9 03/02/2017 11:14 AM    Protein, total 7.1 03/14/2017 12:28 PM    Protein, total 7.5 03/02/2017 11:14 AM    Globulin 4.4 03/14/2017 12:28 PM    Globulin 4.6 03/02/2017 11:14 AM    A-G Ratio 0.6 03/14/2017 12:28 PM    A-G Ratio 0.6 03/02/2017 11:14 AM    AST (SGOT) 25 03/14/2017 12:28 PM    AST (SGOT) 17 03/02/2017 11:14 AM    ALT (SGPT) 15 03/14/2017 12:28 PM    ALT (SGPT) 15 03/02/2017 11:14 AM Lab Results   Component Value Date/Time    WBC 2.6 03/14/2017 12:28 PM    WBC 2.1 03/02/2017 11:14 AM    HGB 12.3 03/14/2017 12:28 PM    HGB 12.3 03/02/2017 11:14 AM    HCT 37.4 03/14/2017 12:28 PM    HCT 37.1 03/02/2017 11:14 AM    PLATELET 401 86/90/3389 12:28 PM    PLATELET 352 48/89/8735 11:14 AM     Lab Results   Component Value Date/Time    aPTT 28.5 03/15/2017 10:23 AM    aPTT 32.0 12/12/2016 09:45 AM    Prothrombin time 11.2 03/15/2017 10:23 AM    Prothrombin time 11.5 12/12/2016 09:45 AM    INR 1.1 03/15/2017 10:23 AM    INR 1.1 12/12/2016 09:45 AM       Assessment:     Colon cancer    Plan:     Planned Procedure:  Port placement    Risks, benefits, and alternatives reviewed with patient and he agrees to proceed with the procedure.       Signed By: Dayanna Morales PA-C     March 24, 2017

## 2017-04-18 ENCOUNTER — HOSPITAL ENCOUNTER (OUTPATIENT)
Dept: INFUSION THERAPY | Age: 72
Discharge: HOME | End: 2017-04-18
Payer: MEDICARE

## 2017-04-18 ENCOUNTER — HOSPITAL ENCOUNTER (OUTPATIENT)
Dept: LAB | Age: 72
Discharge: HOME | End: 2017-04-18
Payer: MEDICARE

## 2017-04-18 DIAGNOSIS — C18.9 COLON CARCINOMA (HCC): ICD-10-CM

## 2017-04-18 DIAGNOSIS — C44.509 CARCINOMA OF ABDOMINAL WALL: ICD-10-CM

## 2017-04-18 LAB
ALBUMIN SERPL BCP-MCNC: 2.9 G/DL (ref 3.2–4.6)
ALBUMIN/GLOB SERPL: 0.7 {RATIO} (ref 1.2–3.5)
ALP SERPL-CCNC: 128 U/L (ref 50–136)
ALT SERPL-CCNC: 31 U/L (ref 12–65)
ANION GAP BLD CALC-SCNC: 6 MMOL/L (ref 7–16)
AST SERPL W P-5'-P-CCNC: 23 U/L (ref 15–37)
BASOPHILS # BLD AUTO: 0.1 K/UL (ref 0–0.2)
BASOPHILS # BLD: 2 % (ref 0–2)
BILIRUB SERPL-MCNC: 0.4 MG/DL (ref 0.2–1.1)
BUN SERPL-MCNC: 12 MG/DL (ref 8–23)
CALCIUM SERPL-MCNC: 8.8 MG/DL (ref 8.3–10.4)
CHLORIDE SERPL-SCNC: 108 MMOL/L (ref 98–107)
CO2 SERPL-SCNC: 28 MMOL/L (ref 21–32)
CREAT SERPL-MCNC: 0.82 MG/DL (ref 0.8–1.5)
DIFFERENTIAL METHOD BLD: ABNORMAL
EOSINOPHIL # BLD: 0.3 K/UL (ref 0–0.8)
EOSINOPHIL NFR BLD: 7 % (ref 0.5–7.8)
ERYTHROCYTE [DISTWIDTH] IN BLOOD BY AUTOMATED COUNT: 17.7 % (ref 11.9–14.6)
GLOBULIN SER CALC-MCNC: 4.1 G/DL (ref 2.3–3.5)
GLUCOSE SERPL-MCNC: 89 MG/DL (ref 65–100)
HCT VFR BLD AUTO: 35.8 % (ref 41.1–50.3)
HGB BLD-MCNC: 11.7 G/DL (ref 13.6–17.2)
LYMPHOCYTES # BLD AUTO: 23 % (ref 13–44)
LYMPHOCYTES # BLD: 1 K/UL (ref 0.5–4.6)
MCH RBC QN AUTO: 32.1 PG (ref 26.1–32.9)
MCHC RBC AUTO-ENTMCNC: 32.7 G/DL (ref 31.4–35)
MCV RBC AUTO: 98.1 FL (ref 79.6–97.8)
MONOCYTES # BLD: 0.6 K/UL (ref 0.1–1.3)
MONOCYTES NFR BLD AUTO: 14 % (ref 4–12)
NEUTS SEG # BLD: 2.3 K/UL (ref 1.7–8.2)
NEUTS SEG NFR BLD AUTO: 54 % (ref 43–78)
NRBC # BLD: 0 K/UL (ref 0–0.2)
PLATELET # BLD AUTO: 220 K/UL (ref 150–450)
PMV BLD AUTO: 9.1 FL (ref 10.8–14.1)
POTASSIUM SERPL-SCNC: 4.2 MMOL/L (ref 3.5–5.1)
PROT SERPL-MCNC: 7 G/DL (ref 6.3–8.2)
RBC # BLD AUTO: 3.65 M/UL (ref 4.23–5.67)
SODIUM SERPL-SCNC: 142 MMOL/L (ref 136–145)
WBC # BLD AUTO: 4.3 K/UL (ref 4.3–11.1)

## 2017-04-18 PROCEDURE — 74011000258 HC RX REV CODE- 258: Performed by: INTERNAL MEDICINE

## 2017-04-18 PROCEDURE — 96368 THER/DIAG CONCURRENT INF: CPT

## 2017-04-18 PROCEDURE — 96415 CHEMO IV INFUSION ADDL HR: CPT

## 2017-04-18 PROCEDURE — 96375 TX/PRO/DX INJ NEW DRUG ADDON: CPT

## 2017-04-18 PROCEDURE — 85025 COMPLETE CBC W/AUTO DIFF WBC: CPT | Performed by: INTERNAL MEDICINE

## 2017-04-18 PROCEDURE — 80053 COMPREHEN METABOLIC PANEL: CPT | Performed by: INTERNAL MEDICINE

## 2017-04-18 PROCEDURE — 96413 CHEMO IV INFUSION 1 HR: CPT

## 2017-04-18 PROCEDURE — 74011250636 HC RX REV CODE- 250/636: Performed by: INTERNAL MEDICINE

## 2017-04-18 PROCEDURE — 74011250636 HC RX REV CODE- 250/636

## 2017-04-18 PROCEDURE — 96411 CHEMO IV PUSH ADDL DRUG: CPT

## 2017-04-18 RX ORDER — DEXTROSE MONOHYDRATE 50 MG/ML
25 INJECTION, SOLUTION INTRAVENOUS CONTINUOUS
Status: ACTIVE | OUTPATIENT
Start: 2017-04-18 | End: 2017-04-18

## 2017-04-18 RX ORDER — DIPHENHYDRAMINE HYDROCHLORIDE 50 MG/ML
50 INJECTION, SOLUTION INTRAMUSCULAR; INTRAVENOUS AS NEEDED
Status: ACTIVE | OUTPATIENT
Start: 2017-04-18 | End: 2017-04-18

## 2017-04-18 RX ORDER — FLUOROURACIL 50 MG/ML
400 INJECTION, SOLUTION INTRAVENOUS ONCE
Status: COMPLETED | OUTPATIENT
Start: 2017-04-18 | End: 2017-04-18

## 2017-04-18 RX ORDER — DEXAMETHASONE SODIUM PHOSPHATE 100 MG/10ML
10 INJECTION INTRAMUSCULAR; INTRAVENOUS ONCE
Status: COMPLETED | OUTPATIENT
Start: 2017-04-18 | End: 2017-04-18

## 2017-04-18 RX ORDER — ONDANSETRON 2 MG/ML
8 INJECTION INTRAMUSCULAR; INTRAVENOUS ONCE
Status: COMPLETED | OUTPATIENT
Start: 2017-04-18 | End: 2017-04-18

## 2017-04-18 RX ORDER — HEPARIN 100 UNIT/ML
300-500 SYRINGE INTRAVENOUS AS NEEDED
Status: ACTIVE | OUTPATIENT
Start: 2017-04-18 | End: 2017-04-18

## 2017-04-18 RX ORDER — SODIUM CHLORIDE 0.9 % (FLUSH) 0.9 %
10 SYRINGE (ML) INJECTION AS NEEDED
Status: ACTIVE | OUTPATIENT
Start: 2017-04-18 | End: 2017-04-18

## 2017-04-18 RX ADMIN — LEUCOVORIN CALCIUM 780 MG: 350 INJECTION, POWDER, LYOPHILIZED, FOR SOLUTION INTRAMUSCULAR; INTRAVENOUS at 12:10

## 2017-04-18 RX ADMIN — ONDANSETRON 8 MG: 2 INJECTION INTRAMUSCULAR; INTRAVENOUS at 11:38

## 2017-04-18 RX ADMIN — FLUOROURACIL 780 MG: 50 INJECTION, SOLUTION INTRAVENOUS at 14:04

## 2017-04-18 RX ADMIN — DEXAMETHASONE SODIUM PHOSPHATE 10 MG: 10 INJECTION INTRAMUSCULAR; INTRAVENOUS at 11:40

## 2017-04-18 RX ADMIN — DEXTROSE MONOHYDRATE 25 ML/HR: 5 INJECTION, SOLUTION INTRAVENOUS at 11:00

## 2017-04-18 RX ADMIN — OXALIPLATIN 166 MG: 5 INJECTION, SOLUTION, CONCENTRATE INTRAVENOUS at 12:06

## 2017-04-18 NOTE — PROGRESS NOTES
Arrived to the [facility]. 1st cycle of FOLFOX completed. Patient tolerated without difficulty. Any issues or concerns during appointment: none. Patient aware of next infusion appointment on 4/20 (date) at 2pm (time). Discharged ambulatory to home with CADD pump.

## 2017-04-19 ENCOUNTER — HOSPITAL ENCOUNTER (OUTPATIENT)
Dept: RADIATION ONCOLOGY | Age: 72
Discharge: HOME | End: 2017-04-19
Payer: MEDICARE

## 2017-04-19 VITALS
WEIGHT: 176.6 LBS | RESPIRATION RATE: 18 BRPM | DIASTOLIC BLOOD PRESSURE: 61 MMHG | HEART RATE: 81 BPM | OXYGEN SATURATION: 95 % | TEMPERATURE: 98.4 F | SYSTOLIC BLOOD PRESSURE: 109 MMHG | BODY MASS INDEX: 24.63 KG/M2

## 2017-04-19 PROCEDURE — 99211 OFF/OP EST MAY X REQ PHY/QHP: CPT

## 2017-04-19 NOTE — PROGRESS NOTES
Patient: Lowell Goldman MRN: [id number]  SSN: [id number]    YOB: 1945  Age: 80 y.o. Sex: male      Other Providers:  Vernon Franco MD, Gloria Walsh MD    CHIEF COMPLAINT: Cancer    DIAGNOSIS: [id number] with abdominal wall invasion and 3 mm margin. (Questionable liver lesion too small to characterize. PREVIOUS TREATMENT:  1) 12/13/16:     1. Open drainage of localized peritonitis. 2. Enterorrhaphy. 3. Right colectomy with ileocolostomy. 4. Open excision of peritoneal mass. 5. 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 6. Radiation of the  Colon with 28 fractions of 5040 cGy as planned. He was treated 02/06/17 through 03/15/17.              7. Adjuvant chemotherapy (FOLFOX) 04/18/17. HISTORY OF PRESENT ILLNESS:  Lowell Goldman is a 80 y.o. male seen by Dr. Nannette Blanco 01/18/17 at the request of Dr. Olena Gomez. He has a relatively benign medical history but is a former smoker and did not visit physicians regularly. He presented with a 3-4 week history of severe right lower quadrant abdominal pain. There was associated intermittent nausea and vomiting. He does not report a significant weight loss over this period. CT imaging showed an enhancing right lower quadrant mass associated with the cecal tip abutting the anterior inferior surface of the terminal ileum measuring 7.3 x 5.3 cm. This was felt to likely represent a primary colon neoplasm. There was also right inguinal and left inguinal hernia and a solitary 6 mm lesion in the left hepatic lobe which was felt to be too small to characterize. He was evaluated by surgery who planned for resection which took place 12/13/2016. 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 The total tumor size was 7.6 cm.   There was no definite lymphovascular or perineural invasion identified but it was noted to be 0.3 cm from the inked radial margin. However, a separate submitted abdominal wall margin was submitted and negative. The peritoneal mass was a calcified peritoneal nodule and contained no malignancy. 0 of 12 lymph nodes identified contained tumor. He was seen by Dr. Jaqueline Todd postoperatively 12/28/2016 and noted to be doing well. Today he again notes he is doing well, eating normally with normal bowel movements. His incision midline is nearly fully healed at this point. He does report some difficult social dynamics caring for his wife who has fibromyalgia and prior stroke. He is also fairly simple but accompanied today by his son who is involved in his care. His case was discussed in tumor board and we felt radiation and medical oncology referral was necessary with potential radiation to prevent local recurrence. INTERVAL HISTORY: Mr. Alirio Chavez returns today for follow up. He is now one month following completion of radiation therapy. He tolerated radiation therapy extremely well. He denies any acute side effect of radiation. He is eating well. He denies any bowel changes. He has \"good\" energy. He started adjuvant FOLFOX yesterday under the management of Dr. Mark Shi. Overall he is doing well and has no complaints. PAST MEDICAL HISTORY:    Past Medical History:   Diagnosis Date    Anxiety and depression     manage well with Klonopin    Cancer ([facility] [address]     tumor removed from colon    History of staph infection 2013    to R hand- treated    Long term (current) use of anticoagulants     Eliquis    Prostate hypertrophy     Pulmonary embolism (HCC)     hx of- started on Eliquis    Smoker     1 pk/day x since age 26    Unspecified disorder of intestine        The patient denies history of collagen vascular diseases, pacemaker insertion, prior radiation or prior chemotherapy.      PAST SURGICAL HISTORY:   Past Surgical History:   Procedure Laterality Date    HX TONSIL AND ADENOIDECTOMY  at age 4   [place] TUMOR REMOVAL removed from colon       MEDICATIONS:     Current Outpatient Prescriptions:     tamsulosin (FLOMAX) 0.4 mg capsule, Take 1 Cap by mouth nightly., Disp: 90 Cap, Rfl: 3    ondansetron (ZOFRAN ODT) 8 mg disintegrating tablet, Take 8 mg by mouth every eight (8) hours as needed for Nausea., Disp: , Rfl:     prochlorperazine (COMPAZINE) 10 mg tablet, Take 1 Tab by mouth every six (6) hours as needed. , Disp: 120 Tab, Rfl: 3    lidocaine-prilocaine (EMLA) topical cream, Apply  to affected area as needed. Apply 1/2 inch amount of cream to port site 90 minutes prior to needle access. , Disp: 30 g, Rfl: 0    clonazePAM (KLONOPIN) 0.5 mg tablet, Take 1 Tab by mouth three (3) times daily as needed. Max Daily Amount: 1.5 mg., Disp: 90 Tab, Rfl: 2    apixaban (ELIQUIS) 5 mg tablet, Take 1 Tab by mouth two (2) times a day. Indications: Pulmonary Thromboembolism (Patient taking differently: Take 5 mg by mouth two (2) times a day. Pt last dose 3.21.17, pt instructed to hold 48 hours prior procedure  Indications: Pulmonary Thromboembolism), Disp: 60 Tab, Rfl: 11    oxyCODONE-acetaminophen (PERCOCET) 5-325 mg per tablet, take 1 to 2 tablets by mouth every 4 hours if needed for pain, Disp: , Rfl: 0  No current facility-administered medications for this encounter. Facility-Administered Medications Ordered in Other Encounters:     fluorouracil (ADRUCIL) CADD cassette 4,680 mg, 2,400 mg/m2 (Treatment Plan Recorded), IntraVENous, ONCE, Gena Barry MD, 4,680 mg at 04/18/17 1410    ALLERGIES:   No Known Allergies    SOCIAL HISTORY:   Social History     Social History    Marital status:      Spouse name: N/A    Number of children: N/A    Years of education: N/A     Occupational History    Not on file. Social History Main Topics    Smoking status: Former Smoker     Packs/day: 2.00     Years: 50.00     Types: Cigarettes     Quit date: 12/13/2016    Smokeless tobacco: Never Used    Alcohol use No    Drug use:  No  Sexual activity: Not on file     Other Topics Concern    Not on file     Social History Narrative       FAMILY HISTORY:   Family History   Problem Relation Age of Onset    Cancer Mother      Pancreatic and Liver    No Known Problems Father     Heart Disease Brother     Heart Attack Brother     No Known Problems Brother        REVIEW OF SYSTEMS: Please see the completed review of systems sheet in the chart that I have reviewed today. PHYSICAL EXAMINATION:   ECOG Performance status 1  VITAL SIGNS:   Visit Vitals    /61    Pulse 81    Temp 98.4 °F (36.9 °C)    Resp 18    Wt 176 lb 9.6 oz (80.1 kg)    SpO2 95%    BMI 24.63 kg/m2        GENERAL: The patient is well-developed, ambulatory, alert and in no acute distress. HEENT: Head is normocephalic, atraumatic. Oral cavity reveals no lesions. Mucous membranes are moist. NECK: Neck is supple with no masses. CARDIOVASCULAR: Heart is regular rate and rhythm. RESPIRATORY: Lungs are clear to auscultation. There is normal respiratory effort. GASTROINTESTINAL: The abdomen is soft, non-tender, nondistended. Digital rectal examination: deferred LYMPHATIC: There is no cervical, supraclavicular lymphadenopathy bilaterally. MUSCULOSKELETAL: Extremities reveal no cyanosis, clubbing or edema.  is 5+/5. Port with clean, dry dressing. PATHOLOGY:     12/13/16 s/p right hemicolectomy with resection of RLQ abd wall and ileocolostomy; Dr Amaro [place]: Natalia Gone: - [address], 7.6 CM.   - DEFINITIVE LYMPHOVASCULAR OR PERINEURAL INVASION IS NOT IDENTIFIED.   - TUMOR IS 0.3 CM FROM THE INKED RADIAL MARGIN.   - TWELVE LYMPH NODES WITH NO TUMOR SEEN (0/12). - MARKED ACUTE SEROSITIS. B: ADDITIONAL RIGHT LOWER QUADRANT ABDOMINAL WALL: - FIBROFATTY TISSUE WITH ACUTE AND CHRONIC INFLAMMATION. NO TUMOR SEEN. C: FINAL MARGIN ABDOMINAL WALL: - FIBROFATTY TISSUE AND SKELETAL MUSCLE WITH ACUTE AND CHRONIC INFLAMMATION. NO TUMOR SEEN. D: Idelle Cowper MASS: - CALCIFIED PERITONEAL NODULE. Electronically signed out on 12/15/2016 15:49 by Wilfrid Novoa M.D., Ph.D.          [facility]:   Lab Results   Component Value Date/Time    Sodium 142 04/18/2017 09:20 AM    Potassium 4.2 04/18/2017 09:20 AM    Chloride 108 04/18/2017 09:20 AM    CO2 28 04/18/2017 09:20 AM    Anion gap 6 04/18/2017 09:20 AM    Glucose 89 04/18/2017 09:20 AM    BUN 12 04/18/2017 09:20 AM    Creatinine 0.82 04/18/2017 09:20 AM    GFR est AA >60 04/18/2017 09:20 AM    GFR est non-AA >60 04/18/2017 09:20 AM    Calcium 8.8 04/18/2017 09:20 AM    Magnesium 2.4 03/14/2017 12:28 PM    Albumin 2.9 04/18/2017 09:20 AM    Protein, total 7.0 04/18/2017 09:20 AM    Globulin 4.1 04/18/2017 09:20 AM    A-G Ratio 0.7 04/18/2017 09:20 AM    AST (SGOT) 23 04/18/2017 09:20 AM    ALT (SGPT) 31 04/18/2017 09:20 AM     Lab Results   Component Value Date/Time    WBC 4.3 04/18/2017 09:20 AM    HGB 11.7 04/18/2017 09:20 AM    HCT 35.8 04/18/2017 09:20 AM    PLATELET 934 02/13/8614 09:20 AM       RADIOLOGY:    I have personally reviewed the imaging and agree with the reports below. Ct Chest W Cont    Result Date: 1/4/2017  CT of the chest with contrast. CLINICAL INDICATION: Colon cancer, evaluate for metastatic disease PROCEDURE: Serial thin section axial images obtained from the thoracic inlet through the upper abdomen following the administration of 80 cc of Optiray-350 intravenous contrast.  Radiation dose reduction techniques were used for this study. Our CT scanners use one or all of the following: Automated exposure control, adjusted of the mA and/or kV according to patient size, iterative reconstruction COMPARISON: Chest x-ray dated 12/12/2016 FINDINGS: No mediastinal or axillary adenopathy evident.  There is an intraluminal filling defect in the left lower lobar pulmonary artery in keeping with acute pulmonary embolus. Pulmonary emboli also appreciated extending into the right upper and middle lobe pulmonary arteries. Clot burden is thought to be small. Dependent atelectatic changes are present. Emphysema is noted. There is no pneumothorax. No suspicious pulmonary nodules present to suspect pulmonary metastatic disease. Limited evaluation of the upper abdomen show the adrenal glands to be normal. No obvious lesions in the liver. No aggressive osseous lesions identified. IMPRESSION: 1. Small, acute bilateral pulmonary emboli with the largest clot noted in the left lower lobar pulmonary artery. 2. Bibasilar dependent atelectasis. 3. Emphysema. 4. No findings to indicate pulmonary metastatic disease. DC5 The critical results contained in this report were communicated to [facility] the nurse for the referring surgeon Dr. Dallas Hutson by Dr. Buffy Calvin on 1/4/2017 at 3:50 PM. Critical results were communicated as outlined in Section II.C.2.a.i of the ACR Practice Guideline for Communication of Diagnostic Imaging Findings. Ct Abd Pelv W Cont    Result Date: 12/2/2016  CT ABDOMEN AND PELVIS WITH CONTRAST 12/2/2016 HISTORY: Right lower quadrant abdominal mass and right lower quadrant pain with nausea and vomiting for 3 weeks. TECHNIQUE: The patient received oral contrast and 100 mL Isovue-370 nonionic IV contrast. Axial images were obtained through the abdomen and pelvis. Coronal reformatted images were generated. All CT scans at this facility used dose modulation, interactive reconstruction and/or weight based dosing when appropriate to reduce radiation dose to as low as reasonably achievable. COMPARISON: None available FINDINGS: Included portions of the lung bases demonstrate lower lobe dependent atelectasis. ABDOMEN: There is an enhancing right lower quadrant mass associated with the cecal tip and abutting the anterior-inferior surface of the terminal ileum.  This also extends into the right lower quadrant anterior abdominal wall and measures 7.3 x 5.3 cm in axial dimensions (image 54). A distended appendix emanates from the approximate location of this mass. This is likely a primary colon neoplasm with obstruction of the appendiceal orifice. An inflammatory mass associated with subacute appendicitis is considered less likely. There is a right inguinal hernia containing fat and a very small left inguinal hernia containing fat. There is a solitary 6 mm low-attenuation left hepatic lobe lesion (image 10) which is too small to further characterize. The gallbladder, pancreas, spleen, adrenal glands, kidneys are normal in appearance. PELVIS: Multiple sigmoid diverticula are present. The bladder is normal in appearance. There is no free pelvic fluid. There are no aggressive osseous lesions. IMPRESSION: 7 cm cecal mass in the right lower quadrant. A dilated appendix associated with this mass is likely distended because of obstruction. Surgical consultation is recommended. DC4 I discussed these findings with Dr. Robin Beauchamp at the time of dictation. IMPRESSION/PLAN:  Denver Delores is a 80 y.o. male with a primary colon neoplasm - [id number] with abdominal wall invasion and 3 mm margin. Generally colon cancer is managed with surgery and adjuvant chemotherapy depending on stage. However, there are circumstances that suggest a high risk of local or regional recurrence such as T4 and close margin. It was decided to proceed with radiation and concurrent chemotherapy. He completed radiation 03/15/17 and is recovering as anticipated from radiation therapy. He tolerated chemo-radiation exceptionally well and denies any acute side effects from his radiation/chemotherapy. He started adjuvant FOLFOX yesterday, 04/18/17, under the management of Dr. Magno Ruby. No further intervention from a radiation standpoint is needed at this time. To continue close follow up with oncology.  We are available if needed in future. Sandi Escobar, NP   April 19, 2017      Portions of this note were copied from prior encounters and reviewed for accuracy, currency, and represent documentation and tasks completed during this encounter. I verify and attest these portions to be unchanged from prior visits.

## 2017-04-19 NOTE — PROGRESS NOTES
Pt here for FUP post RT to the cecum and is currently receiving chemotherapy. Per Dr. Diego Torres pt will not need to have further FUP at radiation oncology.

## 2017-04-20 ENCOUNTER — HOSPITAL ENCOUNTER (OUTPATIENT)
Dept: INFUSION THERAPY | Age: 72
Discharge: HOME | End: 2017-04-20
Payer: MEDICARE

## 2017-04-20 VITALS
DIASTOLIC BLOOD PRESSURE: 58 MMHG | TEMPERATURE: 97 F | BODY MASS INDEX: 24.8 KG/M2 | OXYGEN SATURATION: 94 % | WEIGHT: 177.8 LBS | RESPIRATION RATE: 18 BRPM | HEART RATE: 90 BPM | SYSTOLIC BLOOD PRESSURE: 99 MMHG

## 2017-04-20 PROCEDURE — 96523 IRRIG DRUG DELIVERY DEVICE: CPT

## 2017-04-20 PROCEDURE — 74011250636 HC RX REV CODE- 250/636: Performed by: INTERNAL MEDICINE

## 2017-04-20 RX ORDER — SODIUM CHLORIDE 0.9 % (FLUSH) 0.9 %
10-40 SYRINGE (ML) INJECTION AS NEEDED
Status: DISCONTINUED | OUTPATIENT
Start: 2017-04-20 | End: 2017-04-24 | Stop reason: HOSPADM

## 2017-04-20 RX ORDER — HEPARIN 100 UNIT/ML
500 SYRINGE INTRAVENOUS AS NEEDED
Status: DISPENSED | OUTPATIENT
Start: 2017-04-20 | End: 2017-04-21

## 2017-04-20 RX ADMIN — SODIUM CHLORIDE, PRESERVATIVE FREE 500 UNITS: 5 INJECTION INTRAVENOUS at 14:14

## 2017-04-20 RX ADMIN — Medication 20 ML: at 14:14

## 2017-05-02 ENCOUNTER — HOSPITAL ENCOUNTER (OUTPATIENT)
Dept: INFUSION THERAPY | Age: 72
Discharge: HOME | End: 2017-05-02
Payer: MEDICARE

## 2017-05-02 ENCOUNTER — HOSPITAL ENCOUNTER (OUTPATIENT)
Dept: LAB | Age: 72
Discharge: HOME | End: 2017-05-02
Payer: MEDICARE

## 2017-05-02 DIAGNOSIS — C44.509 CARCINOMA OF ABDOMINAL WALL: ICD-10-CM

## 2017-05-02 DIAGNOSIS — C18.9 COLON CARCINOMA (HCC): ICD-10-CM

## 2017-05-02 LAB
ALBUMIN SERPL BCP-MCNC: 3 G/DL (ref 3.2–4.6)
ALBUMIN/GLOB SERPL: 0.8 {RATIO} (ref 1.2–3.5)
ALP SERPL-CCNC: 112 U/L (ref 50–136)
ALT SERPL-CCNC: 27 U/L (ref 12–65)
ANION GAP BLD CALC-SCNC: 7 MMOL/L (ref 7–16)
AST SERPL W P-5'-P-CCNC: 29 U/L (ref 15–37)
BASOPHILS # BLD AUTO: 0.1 K/UL (ref 0–0.2)
BASOPHILS # BLD: 2 % (ref 0–2)
BILIRUB SERPL-MCNC: 0.5 MG/DL (ref 0.2–1.1)
BUN SERPL-MCNC: 15 MG/DL (ref 8–23)
CALCIUM SERPL-MCNC: 8.9 MG/DL (ref 8.3–10.4)
CHLORIDE SERPL-SCNC: 108 MMOL/L (ref 98–107)
CO2 SERPL-SCNC: 27 MMOL/L (ref 21–32)
CREAT SERPL-MCNC: 0.75 MG/DL (ref 0.8–1.5)
DIFFERENTIAL METHOD BLD: ABNORMAL
EOSINOPHIL # BLD: 0.3 K/UL (ref 0–0.8)
EOSINOPHIL NFR BLD: 7 % (ref 0.5–7.8)
ERYTHROCYTE [DISTWIDTH] IN BLOOD BY AUTOMATED COUNT: 16.4 % (ref 11.9–14.6)
GLOBULIN SER CALC-MCNC: 3.8 G/DL (ref 2.3–3.5)
GLUCOSE SERPL-MCNC: 92 MG/DL (ref 65–100)
HCT VFR BLD AUTO: 34.8 % (ref 41.1–50.3)
HGB BLD-MCNC: 11.7 G/DL (ref 13.6–17.2)
LYMPHOCYTES # BLD AUTO: 22 % (ref 13–44)
LYMPHOCYTES # BLD: 0.9 K/UL (ref 0.5–4.6)
MCH RBC QN AUTO: 32.3 PG (ref 26.1–32.9)
MCHC RBC AUTO-ENTMCNC: 33.6 G/DL (ref 31.4–35)
MCV RBC AUTO: 96.1 FL (ref 79.6–97.8)
MONOCYTES # BLD: 0.5 K/UL (ref 0.1–1.3)
MONOCYTES NFR BLD AUTO: 13 % (ref 4–12)
NEUTS SEG # BLD: 2.4 K/UL (ref 1.7–8.2)
NEUTS SEG NFR BLD AUTO: 57 % (ref 43–78)
NRBC # BLD: 0 K/UL (ref 0–0.2)
PLATELET # BLD AUTO: 148 K/UL (ref 150–450)
PMV BLD AUTO: 9.5 FL (ref 10.8–14.1)
POTASSIUM SERPL-SCNC: 3.7 MMOL/L (ref 3.5–5.1)
PROT SERPL-MCNC: 6.8 G/DL (ref 6.3–8.2)
RBC # BLD AUTO: 3.62 M/UL (ref 4.23–5.67)
SODIUM SERPL-SCNC: 142 MMOL/L (ref 136–145)
WBC # BLD AUTO: 4.2 K/UL (ref 4.3–11.1)

## 2017-05-02 PROCEDURE — 74011250636 HC RX REV CODE- 250/636

## 2017-05-02 PROCEDURE — 80053 COMPREHEN METABOLIC PANEL: CPT | Performed by: NURSE PRACTITIONER

## 2017-05-02 PROCEDURE — 96368 THER/DIAG CONCURRENT INF: CPT

## 2017-05-02 PROCEDURE — 96413 CHEMO IV INFUSION 1 HR: CPT

## 2017-05-02 PROCEDURE — 96411 CHEMO IV PUSH ADDL DRUG: CPT

## 2017-05-02 PROCEDURE — 96415 CHEMO IV INFUSION ADDL HR: CPT

## 2017-05-02 PROCEDURE — 74011250636 HC RX REV CODE- 250/636: Performed by: NURSE PRACTITIONER

## 2017-05-02 PROCEDURE — 85025 COMPLETE CBC W/AUTO DIFF WBC: CPT | Performed by: NURSE PRACTITIONER

## 2017-05-02 PROCEDURE — 96375 TX/PRO/DX INJ NEW DRUG ADDON: CPT

## 2017-05-02 PROCEDURE — 74011000258 HC RX REV CODE- 258: Performed by: NURSE PRACTITIONER

## 2017-05-02 RX ORDER — DEXAMETHASONE SODIUM PHOSPHATE 100 MG/10ML
10 INJECTION INTRAMUSCULAR; INTRAVENOUS ONCE
Status: COMPLETED | OUTPATIENT
Start: 2017-05-02 | End: 2017-05-02

## 2017-05-02 RX ORDER — ONDANSETRON 2 MG/ML
8 INJECTION INTRAMUSCULAR; INTRAVENOUS ONCE
Status: COMPLETED | OUTPATIENT
Start: 2017-05-02 | End: 2017-05-02

## 2017-05-02 RX ORDER — FLUOROURACIL 50 MG/ML
400 INJECTION, SOLUTION INTRAVENOUS ONCE
Status: COMPLETED | OUTPATIENT
Start: 2017-05-02 | End: 2017-05-02

## 2017-05-02 RX ORDER — DEXTROSE MONOHYDRATE 50 MG/ML
25 INJECTION, SOLUTION INTRAVENOUS CONTINUOUS
Status: ACTIVE | OUTPATIENT
Start: 2017-05-02 | End: 2017-05-02

## 2017-05-02 RX ADMIN — FLUOROURACIL 780 MG: 50 INJECTION, SOLUTION INTRAVENOUS at 12:06

## 2017-05-02 RX ADMIN — LEUCOVORIN CALCIUM 780 MG: 350 INJECTION, POWDER, LYOPHILIZED, FOR SOLUTION INTRAMUSCULAR; INTRAVENOUS at 10:06

## 2017-05-02 RX ADMIN — OXALIPLATIN 166 MG: 5 INJECTION, SOLUTION, CONCENTRATE INTRAVENOUS at 10:06

## 2017-05-02 RX ADMIN — ONDANSETRON 8 MG: 2 INJECTION INTRAMUSCULAR; INTRAVENOUS at 09:34

## 2017-05-02 RX ADMIN — DEXTROSE MONOHYDRATE 25 ML/HR: 5 INJECTION, SOLUTION INTRAVENOUS at 09:36

## 2017-05-02 RX ADMIN — DEXAMETHASONE SODIUM PHOSPHATE 10 MG: 10 INJECTION INTRAMUSCULAR; INTRAVENOUS at 09:35

## 2017-05-02 NOTE — PROGRESS NOTES
Arrived to the [facility]. FOLFOX completed.  Patient tolerated without problems  Any issues or concerns during appointment: no  Patient aware of next infusion appointment on 5/4/17 at 1600  Discharged ambulatory

## 2017-05-04 ENCOUNTER — HOSPITAL ENCOUNTER (OUTPATIENT)
Dept: INFUSION THERAPY | Age: 72
Discharge: HOME | End: 2017-05-04
Payer: MEDICARE

## 2017-05-04 VITALS
TEMPERATURE: 97.6 F | BODY MASS INDEX: 24.83 KG/M2 | RESPIRATION RATE: 16 BRPM | WEIGHT: 178 LBS | HEART RATE: 78 BPM | SYSTOLIC BLOOD PRESSURE: 101 MMHG | DIASTOLIC BLOOD PRESSURE: 49 MMHG | OXYGEN SATURATION: 96 %

## 2017-05-04 PROCEDURE — 96523 IRRIG DRUG DELIVERY DEVICE: CPT

## 2017-05-04 PROCEDURE — 74011250636 HC RX REV CODE- 250/636: Performed by: INTERNAL MEDICINE

## 2017-05-04 RX ORDER — SODIUM CHLORIDE 0.9 % (FLUSH) 0.9 %
5-10 SYRINGE (ML) INJECTION EVERY 8 HOURS
Status: DISCONTINUED | OUTPATIENT
Start: 2017-05-04 | End: 2017-05-08 | Stop reason: HOSPADM

## 2017-05-04 RX ORDER — HEPARIN 100 UNIT/ML
500 SYRINGE INTRAVENOUS AS NEEDED
Status: DISPENSED | OUTPATIENT
Start: 2017-05-04 | End: 2017-05-04

## 2017-05-04 RX ADMIN — SODIUM CHLORIDE, PRESERVATIVE FREE 500 UNITS: 5 INJECTION INTRAVENOUS at 16:20

## 2017-05-04 RX ADMIN — Medication 10 ML: at 16:20

## 2017-05-04 NOTE — PROGRESS NOTES
Pt. Discharged ambulatory. Continuous infusion pump removed per protocol. No distress noted. To call physician with any problems or concerns. Understanding voiced. To return to Infusions on 5/16/17.

## 2017-05-16 ENCOUNTER — HOSPITAL ENCOUNTER (OUTPATIENT)
Dept: INFUSION THERAPY | Age: 72
Discharge: HOME | End: 2017-05-16
Payer: MEDICARE

## 2017-05-16 ENCOUNTER — HOSPITAL ENCOUNTER (OUTPATIENT)
Dept: LAB | Age: 72
Discharge: HOME | End: 2017-05-16
Payer: MEDICARE

## 2017-05-16 VITALS — WEIGHT: 174.6 LBS | BODY MASS INDEX: 24.35 KG/M2

## 2017-05-16 DIAGNOSIS — C44.509 CARCINOMA OF ABDOMINAL WALL: ICD-10-CM

## 2017-05-16 DIAGNOSIS — R16.0 LIVER MASS: ICD-10-CM

## 2017-05-16 DIAGNOSIS — C18.9 COLON CARCINOMA (HCC): ICD-10-CM

## 2017-05-16 DIAGNOSIS — K56.609 COLON OBSTRUCTION (HCC): ICD-10-CM

## 2017-05-16 LAB
ALBUMIN SERPL BCP-MCNC: 3.2 G/DL (ref 3.2–4.6)
ALBUMIN/GLOB SERPL: 0.9 {RATIO} (ref 1.2–3.5)
ALP SERPL-CCNC: 120 U/L (ref 50–136)
ALT SERPL-CCNC: 35 U/L (ref 12–65)
ANION GAP BLD CALC-SCNC: 7 MMOL/L (ref 7–16)
AST SERPL W P-5'-P-CCNC: 31 U/L (ref 15–37)
BASOPHILS # BLD AUTO: 0.1 K/UL (ref 0–0.2)
BASOPHILS # BLD: 2 % (ref 0–2)
BILIRUB SERPL-MCNC: 0.6 MG/DL (ref 0.2–1.1)
BUN SERPL-MCNC: 11 MG/DL (ref 8–23)
CALCIUM SERPL-MCNC: 9 MG/DL (ref 8.3–10.4)
CHLORIDE SERPL-SCNC: 107 MMOL/L (ref 98–107)
CO2 SERPL-SCNC: 27 MMOL/L (ref 21–32)
CREAT SERPL-MCNC: 0.75 MG/DL (ref 0.8–1.5)
DIFFERENTIAL METHOD BLD: ABNORMAL
EOSINOPHIL # BLD: 0.2 K/UL (ref 0–0.8)
EOSINOPHIL NFR BLD: 7 % (ref 0.5–7.8)
ERYTHROCYTE [DISTWIDTH] IN BLOOD BY AUTOMATED COUNT: 15.5 % (ref 11.9–14.6)
GLOBULIN SER CALC-MCNC: 3.7 G/DL (ref 2.3–3.5)
GLUCOSE SERPL-MCNC: 90 MG/DL (ref 65–100)
HCT VFR BLD AUTO: 34.5 % (ref 41.1–50.3)
HGB BLD-MCNC: 11.7 G/DL (ref 13.6–17.2)
LYMPHOCYTES # BLD AUTO: 26 % (ref 13–44)
LYMPHOCYTES # BLD: 0.8 K/UL (ref 0.5–4.6)
MAGNESIUM SERPL-MCNC: 2.2 MG/DL (ref 1.8–2.4)
MCH RBC QN AUTO: 32.9 PG (ref 26.1–32.9)
MCHC RBC AUTO-ENTMCNC: 33.9 G/DL (ref 31.4–35)
MCV RBC AUTO: 96.9 FL (ref 79.6–97.8)
MONOCYTES # BLD: 0.5 K/UL (ref 0.1–1.3)
MONOCYTES NFR BLD AUTO: 19 % (ref 4–12)
NEUTS SEG # BLD: 1.3 K/UL (ref 1.7–8.2)
NEUTS SEG NFR BLD AUTO: 45 % (ref 43–78)
NRBC # BLD: 0 K/UL (ref 0–0.2)
PLATELET # BLD AUTO: 96 K/UL (ref 150–450)
PMV BLD AUTO: 10.1 FL (ref 10.8–14.1)
POTASSIUM SERPL-SCNC: 3.9 MMOL/L (ref 3.5–5.1)
PROT SERPL-MCNC: 6.9 G/DL (ref 6.3–8.2)
RBC # BLD AUTO: 3.56 M/UL (ref 4.23–5.67)
SODIUM SERPL-SCNC: 141 MMOL/L (ref 136–145)
WBC # BLD AUTO: 2.8 K/UL (ref 4.3–11.1)

## 2017-05-16 PROCEDURE — 80053 COMPREHEN METABOLIC PANEL: CPT | Performed by: INTERNAL MEDICINE

## 2017-05-16 PROCEDURE — 96415 CHEMO IV INFUSION ADDL HR: CPT

## 2017-05-16 PROCEDURE — 74011250636 HC RX REV CODE- 250/636

## 2017-05-16 PROCEDURE — 85025 COMPLETE CBC W/AUTO DIFF WBC: CPT | Performed by: INTERNAL MEDICINE

## 2017-05-16 PROCEDURE — 96413 CHEMO IV INFUSION 1 HR: CPT

## 2017-05-16 PROCEDURE — 83735 ASSAY OF MAGNESIUM: CPT | Performed by: INTERNAL MEDICINE

## 2017-05-16 PROCEDURE — 96411 CHEMO IV PUSH ADDL DRUG: CPT

## 2017-05-16 PROCEDURE — 74011250636 HC RX REV CODE- 250/636: Performed by: INTERNAL MEDICINE

## 2017-05-16 PROCEDURE — 96368 THER/DIAG CONCURRENT INF: CPT

## 2017-05-16 PROCEDURE — 74011000258 HC RX REV CODE- 258: Performed by: INTERNAL MEDICINE

## 2017-05-16 PROCEDURE — 96375 TX/PRO/DX INJ NEW DRUG ADDON: CPT

## 2017-05-16 RX ORDER — HEPARIN 100 UNIT/ML
300-500 SYRINGE INTRAVENOUS AS NEEDED
Status: ACTIVE | OUTPATIENT
Start: 2017-05-16 | End: 2017-05-17

## 2017-05-16 RX ORDER — DEXTROSE MONOHYDRATE 50 MG/ML
25 INJECTION, SOLUTION INTRAVENOUS CONTINUOUS
Status: ACTIVE | OUTPATIENT
Start: 2017-05-16 | End: 2017-05-17

## 2017-05-16 RX ORDER — SODIUM CHLORIDE 0.9 % (FLUSH) 0.9 %
10 SYRINGE (ML) INJECTION AS NEEDED
Status: ACTIVE | OUTPATIENT
Start: 2017-05-16 | End: 2017-05-17

## 2017-05-16 RX ORDER — DEXAMETHASONE SODIUM PHOSPHATE 100 MG/10ML
10 INJECTION INTRAMUSCULAR; INTRAVENOUS ONCE
Status: COMPLETED | OUTPATIENT
Start: 2017-05-16 | End: 2017-05-16

## 2017-05-16 RX ORDER — ONDANSETRON 2 MG/ML
8 INJECTION INTRAMUSCULAR; INTRAVENOUS ONCE
Status: COMPLETED | OUTPATIENT
Start: 2017-05-16 | End: 2017-05-16

## 2017-05-16 RX ORDER — FLUOROURACIL 50 MG/ML
400 INJECTION, SOLUTION INTRAVENOUS ONCE
Status: COMPLETED | OUTPATIENT
Start: 2017-05-16 | End: 2017-05-16

## 2017-05-16 RX ADMIN — DEXAMETHASONE SODIUM PHOSPHATE 10 MG: 10 INJECTION INTRAMUSCULAR; INTRAVENOUS at 12:56

## 2017-05-16 RX ADMIN — OXALIPLATIN 166 MG: 5 INJECTION, SOLUTION, CONCENTRATE INTRAVENOUS at 13:37

## 2017-05-16 RX ADMIN — ONDANSETRON 8 MG: 2 INJECTION, SOLUTION INTRAMUSCULAR; INTRAVENOUS at 12:57

## 2017-05-16 RX ADMIN — DEXTROSE MONOHYDRATE 25 ML/HR: 5 INJECTION, SOLUTION INTRAVENOUS at 12:58

## 2017-05-16 RX ADMIN — FLUOROURACIL 780 MG: 50 INJECTION, SOLUTION INTRAVENOUS at 15:38

## 2017-05-16 RX ADMIN — LEUCOVORIN CALCIUM 780 MG: 350 INJECTION, POWDER, LYOPHILIZED, FOR SOLUTION INTRAMUSCULAR; INTRAVENOUS at 13:37

## 2017-05-16 NOTE — PROGRESS NOTES
Arrived to the [facility]. FOLFOX completed.  Patient tolerated without problems, Adrucil pump infusing at discharge  Any issues or concerns during appointment: no  Patient aware of next infusion appointment on 5/18/17 at 1600  Discharged ambulatory

## 2017-05-18 ENCOUNTER — HOSPITAL ENCOUNTER (OUTPATIENT)
Dept: INFUSION THERAPY | Age: 72
Discharge: HOME | End: 2017-05-18
Payer: MEDICARE

## 2017-05-18 VITALS
RESPIRATION RATE: 16 BRPM | WEIGHT: 175.6 LBS | DIASTOLIC BLOOD PRESSURE: 55 MMHG | OXYGEN SATURATION: 94 % | BODY MASS INDEX: 24.49 KG/M2 | SYSTOLIC BLOOD PRESSURE: 101 MMHG | TEMPERATURE: 98.1 F | HEART RATE: 73 BPM

## 2017-05-18 PROCEDURE — 74011250636 HC RX REV CODE- 250/636: Performed by: INTERNAL MEDICINE

## 2017-05-18 PROCEDURE — 96523 IRRIG DRUG DELIVERY DEVICE: CPT

## 2017-05-18 RX ORDER — HEPARIN 100 UNIT/ML
500 SYRINGE INTRAVENOUS AS NEEDED
Status: DISPENSED | OUTPATIENT
Start: 2017-05-18 | End: 2017-05-19

## 2017-05-18 RX ORDER — SODIUM CHLORIDE 0.9 % (FLUSH) 0.9 %
10 SYRINGE (ML) INJECTION AS NEEDED
Status: ACTIVE | OUTPATIENT
Start: 2017-05-18 | End: 2017-05-19

## 2017-05-18 RX ADMIN — Medication 10 ML: at 16:10

## 2017-05-18 RX ADMIN — SODIUM CHLORIDE, PRESERVATIVE FREE 500 UNITS: 5 INJECTION INTRAVENOUS at 16:10

## 2017-05-18 NOTE — PROGRESS NOTES
Pt arrived ambulatory for pump dc, he denies complaints. Positive blood return from [facility], de-accessed per protocol. Pt dc'd stable, to return to [facility] on 5/30 at 1000.

## 2017-05-30 ENCOUNTER — HOSPITAL ENCOUNTER (OUTPATIENT)
Dept: LAB | Age: 72
Discharge: HOME | End: 2017-05-30
Payer: MEDICARE

## 2017-05-30 ENCOUNTER — HOSPITAL ENCOUNTER (OUTPATIENT)
Dept: INFUSION THERAPY | Age: 72
Discharge: HOME | End: 2017-05-30
Payer: MEDICARE

## 2017-05-30 DIAGNOSIS — C18.9 COLON CARCINOMA (HCC): ICD-10-CM

## 2017-05-30 LAB
ALBUMIN SERPL BCP-MCNC: 3 G/DL (ref 3.2–4.6)
ALBUMIN/GLOB SERPL: 0.8 {RATIO} (ref 1.2–3.5)
ALP SERPL-CCNC: 122 U/L (ref 50–136)
ALT SERPL-CCNC: 31 U/L (ref 12–65)
ANION GAP BLD CALC-SCNC: 7 MMOL/L (ref 7–16)
AST SERPL W P-5'-P-CCNC: 28 U/L (ref 15–37)
BASOPHILS # BLD AUTO: 0 K/UL (ref 0–0.2)
BASOPHILS # BLD: 2 % (ref 0–2)
BILIRUB SERPL-MCNC: 0.4 MG/DL (ref 0.2–1.1)
BUN SERPL-MCNC: 12 MG/DL (ref 8–23)
CALCIUM SERPL-MCNC: 8.8 MG/DL (ref 8.3–10.4)
CHLORIDE SERPL-SCNC: 109 MMOL/L (ref 98–107)
CO2 SERPL-SCNC: 26 MMOL/L (ref 21–32)
CREAT SERPL-MCNC: 0.82 MG/DL (ref 0.8–1.5)
DIFFERENTIAL METHOD BLD: ABNORMAL
EOSINOPHIL # BLD: 0.2 K/UL (ref 0–0.8)
EOSINOPHIL NFR BLD: 10 % (ref 0.5–7.8)
ERYTHROCYTE [DISTWIDTH] IN BLOOD BY AUTOMATED COUNT: 15.3 % (ref 11.9–14.6)
GLOBULIN SER CALC-MCNC: 4 G/DL (ref 2.3–3.5)
GLUCOSE SERPL-MCNC: 93 MG/DL (ref 65–100)
HCT VFR BLD AUTO: 34.5 % (ref 41.1–50.3)
HGB BLD-MCNC: 11.6 G/DL (ref 13.6–17.2)
LYMPHOCYTES # BLD AUTO: 31 % (ref 13–44)
LYMPHOCYTES # BLD: 0.7 K/UL (ref 0.5–4.6)
MAGNESIUM SERPL-MCNC: 2 MG/DL (ref 1.8–2.4)
MCH RBC QN AUTO: 33 PG (ref 26.1–32.9)
MCHC RBC AUTO-ENTMCNC: 33.6 G/DL (ref 31.4–35)
MCV RBC AUTO: 98.3 FL (ref 79.6–97.8)
MONOCYTES # BLD: 0.5 K/UL (ref 0.1–1.3)
MONOCYTES NFR BLD AUTO: 20 % (ref 4–12)
NEUTS SEG # BLD: 0.8 K/UL (ref 1.7–8.2)
NEUTS SEG NFR BLD AUTO: 37 % (ref 43–78)
NRBC # BLD: 0 K/UL (ref 0–0.2)
PLATELET # BLD AUTO: 74 K/UL (ref 150–450)
PMV BLD AUTO: 10.2 FL (ref 10.8–14.1)
POTASSIUM SERPL-SCNC: 4.1 MMOL/L (ref 3.5–5.1)
PROT SERPL-MCNC: 7 G/DL (ref 6.3–8.2)
RBC # BLD AUTO: 3.51 M/UL (ref 4.23–5.67)
SODIUM SERPL-SCNC: 142 MMOL/L (ref 136–145)
WBC # BLD AUTO: 2.3 K/UL (ref 4.3–11.1)

## 2017-05-30 PROCEDURE — 83735 ASSAY OF MAGNESIUM: CPT | Performed by: NURSE PRACTITIONER

## 2017-05-30 PROCEDURE — 85025 COMPLETE CBC W/AUTO DIFF WBC: CPT | Performed by: NURSE PRACTITIONER

## 2017-05-30 PROCEDURE — 80053 COMPREHEN METABOLIC PANEL: CPT | Performed by: NURSE PRACTITIONER

## 2017-06-01 ENCOUNTER — APPOINTMENT (OUTPATIENT)
Dept: INFUSION THERAPY | Age: 72
End: 2017-06-01

## 2017-06-06 ENCOUNTER — HOSPITAL ENCOUNTER (OUTPATIENT)
Dept: INFUSION THERAPY | Age: 72
Discharge: HOME | End: 2017-06-06
Payer: MEDICARE

## 2017-06-06 VITALS
WEIGHT: 174 LBS | SYSTOLIC BLOOD PRESSURE: 102 MMHG | OXYGEN SATURATION: 96 % | HEART RATE: 61 BPM | BODY MASS INDEX: 24.27 KG/M2 | TEMPERATURE: 97.7 F | RESPIRATION RATE: 18 BRPM | DIASTOLIC BLOOD PRESSURE: 54 MMHG

## 2017-06-06 DIAGNOSIS — C44.509 CARCINOMA OF ABDOMINAL WALL: ICD-10-CM

## 2017-06-06 LAB
ALBUMIN SERPL BCP-MCNC: 3.1 G/DL (ref 3.2–4.6)
ALBUMIN/GLOB SERPL: 0.7 {RATIO} (ref 1.2–3.5)
ALP SERPL-CCNC: 136 U/L (ref 50–136)
ALT SERPL-CCNC: 35 U/L (ref 12–65)
ANION GAP BLD CALC-SCNC: 6 MMOL/L (ref 7–16)
AST SERPL W P-5'-P-CCNC: 35 U/L (ref 15–37)
BASOPHILS # BLD AUTO: 0 K/UL (ref 0–0.2)
BASOPHILS # BLD: 1 % (ref 0–2)
BILIRUB SERPL-MCNC: 0.4 MG/DL (ref 0.2–1.1)
BUN SERPL-MCNC: 12 MG/DL (ref 8–23)
CALCIUM SERPL-MCNC: 9.1 MG/DL (ref 8.3–10.4)
CHLORIDE SERPL-SCNC: 106 MMOL/L (ref 98–107)
CO2 SERPL-SCNC: 29 MMOL/L (ref 21–32)
CREAT SERPL-MCNC: 0.83 MG/DL (ref 0.8–1.5)
DIFFERENTIAL METHOD BLD: ABNORMAL
EOSINOPHIL # BLD: 0.2 K/UL (ref 0–0.8)
EOSINOPHIL NFR BLD: 8 % (ref 0.5–7.8)
ERYTHROCYTE [DISTWIDTH] IN BLOOD BY AUTOMATED COUNT: 15.4 % (ref 11.9–14.6)
GLOBULIN SER CALC-MCNC: 4.3 G/DL (ref 2.3–3.5)
GLUCOSE SERPL-MCNC: 91 MG/DL (ref 65–100)
HCT VFR BLD AUTO: 35.4 % (ref 41.1–50.3)
HGB BLD-MCNC: 12.1 G/DL (ref 13.6–17.2)
LYMPHOCYTES # BLD AUTO: 32 % (ref 13–44)
LYMPHOCYTES # BLD: 0.7 K/UL (ref 0.5–4.6)
MCH RBC QN AUTO: 33.5 PG (ref 26.1–32.9)
MCHC RBC AUTO-ENTMCNC: 34.2 G/DL (ref 31.4–35)
MCV RBC AUTO: 98.1 FL (ref 79.6–97.8)
MONOCYTES # BLD: 0.5 K/UL (ref 0.1–1.3)
MONOCYTES NFR BLD AUTO: 23 % (ref 4–12)
NEUTS SEG # BLD: 0.8 K/UL (ref 1.7–8.2)
NEUTS SEG NFR BLD AUTO: 37 % (ref 43–78)
NRBC # BLD: 0 K/UL (ref 0–0.2)
PLATELET # BLD AUTO: 110 K/UL (ref 150–450)
PMV BLD AUTO: 10.1 FL (ref 10.8–14.1)
POTASSIUM SERPL-SCNC: 4.2 MMOL/L (ref 3.5–5.1)
PROT SERPL-MCNC: 7.4 G/DL (ref 6.3–8.2)
RBC # BLD AUTO: 3.61 M/UL (ref 4.23–5.67)
SODIUM SERPL-SCNC: 141 MMOL/L (ref 136–145)
WBC # BLD AUTO: 2.2 K/UL (ref 4.3–11.1)

## 2017-06-06 PROCEDURE — 85025 COMPLETE CBC W/AUTO DIFF WBC: CPT | Performed by: INTERNAL MEDICINE

## 2017-06-06 PROCEDURE — 74011250636 HC RX REV CODE- 250/636: Performed by: INTERNAL MEDICINE

## 2017-06-06 PROCEDURE — 36591 DRAW BLOOD OFF VENOUS DEVICE: CPT

## 2017-06-06 PROCEDURE — 77030003560 HC NDL HUBR BARD -A

## 2017-06-06 PROCEDURE — 80053 COMPREHEN METABOLIC PANEL: CPT | Performed by: INTERNAL MEDICINE

## 2017-06-06 RX ORDER — SODIUM CHLORIDE 0.9 % (FLUSH) 0.9 %
5-10 SYRINGE (ML) INJECTION AS NEEDED
Status: DISCONTINUED | OUTPATIENT
Start: 2017-06-06 | End: 2017-06-10 | Stop reason: HOSPADM

## 2017-06-06 RX ORDER — HEPARIN 100 UNIT/ML
500 SYRINGE INTRAVENOUS AS NEEDED
Status: DISPENSED | OUTPATIENT
Start: 2017-06-06 | End: 2017-06-06

## 2017-06-06 RX ADMIN — Medication 10 ML: at 10:18

## 2017-06-06 RX ADMIN — SODIUM CHLORIDE, PRESERVATIVE FREE 500 UNITS: 5 INJECTION INTRAVENOUS at 10:19

## 2017-06-06 NOTE — PROGRESS NOTES
Pt arrived ambulatory to [facility] with port previously accessed with dressing dry and intact and with good blood return. Awaiting lab results to proceed. . Call to NP and left message. No treatment today. Pt scheduled for granix Saturday and [place] 6/10 and 6/11 for granix then return Tuesday 6/13 for labs and treatment. Pt given new schedule. Port flushed and packed with heparin and de acesssed. Pt discharged ambulatory.

## 2017-06-08 ENCOUNTER — APPOINTMENT (OUTPATIENT)
Dept: INFUSION THERAPY | Age: 72
End: 2017-06-08
Payer: MEDICARE

## 2017-06-10 ENCOUNTER — HOSPITAL ENCOUNTER (OUTPATIENT)
Dept: INFUSION THERAPY | Age: 72
Discharge: HOME | End: 2017-06-10
Payer: MEDICARE

## 2017-06-10 VITALS
RESPIRATION RATE: 18 BRPM | TEMPERATURE: 97.8 F | OXYGEN SATURATION: 96 % | HEART RATE: 69 BPM | SYSTOLIC BLOOD PRESSURE: 106 MMHG | DIASTOLIC BLOOD PRESSURE: 57 MMHG

## 2017-06-10 DIAGNOSIS — C44.509 CARCINOMA OF ABDOMINAL WALL: ICD-10-CM

## 2017-06-10 PROCEDURE — 74011250636 HC RX REV CODE- 250/636: Performed by: NURSE PRACTITIONER

## 2017-06-10 PROCEDURE — 96372 THER/PROPH/DIAG INJ SC/IM: CPT

## 2017-06-10 RX ADMIN — TBO-FILGRASTIM 300 MCG: 300 INJECTION, SOLUTION SUBCUTANEOUS at 13:15

## 2017-06-10 NOTE — PROGRESS NOTES
Arrived to the [facility] ambulatory. granix inj. completed. Patient tolerated well. Any issues or concerns during appointment: no.  Patient aware of next infusion appointment on  6/11 at 1300. Discharged to home ambulatory.

## 2017-06-11 ENCOUNTER — HOSPITAL ENCOUNTER (OUTPATIENT)
Dept: INFUSION THERAPY | Age: 72
Discharge: HOME | End: 2017-06-11
Payer: MEDICARE

## 2017-06-11 VITALS
SYSTOLIC BLOOD PRESSURE: 117 MMHG | RESPIRATION RATE: 18 BRPM | HEART RATE: 76 BPM | DIASTOLIC BLOOD PRESSURE: 63 MMHG | OXYGEN SATURATION: 96 % | TEMPERATURE: 98 F

## 2017-06-11 DIAGNOSIS — C44.509 CARCINOMA OF ABDOMINAL WALL: ICD-10-CM

## 2017-06-11 PROCEDURE — 74011250636 HC RX REV CODE- 250/636: Performed by: NURSE PRACTITIONER

## 2017-06-11 PROCEDURE — 96372 THER/PROPH/DIAG INJ SC/IM: CPT

## 2017-06-11 RX ADMIN — TBO-FILGRASTIM 300 MCG: 300 INJECTION, SOLUTION SUBCUTANEOUS at 13:05

## 2017-06-11 NOTE — PROGRESS NOTES
Arrived to the [facility]. Granix completed. Patient tolerated well. Any issues or concerns during appointment: none. Patient aware of next infusion appointment on 6-13-17 (date) at 2pm (time). Discharged via ambulatory.

## 2017-06-12 RX ORDER — ACETAMINOPHEN 325 MG/1
650 TABLET ORAL AS NEEDED
Status: CANCELLED
Start: 2017-06-13

## 2017-06-12 RX ORDER — DIPHENHYDRAMINE HYDROCHLORIDE 50 MG/ML
50 INJECTION, SOLUTION INTRAMUSCULAR; INTRAVENOUS AS NEEDED
Status: CANCELLED
Start: 2017-06-13

## 2017-06-12 RX ORDER — HYDROCORTISONE SODIUM SUCCINATE 100 MG/2ML
100 INJECTION, POWDER, FOR SOLUTION INTRAMUSCULAR; INTRAVENOUS AS NEEDED
Status: CANCELLED | OUTPATIENT
Start: 2017-06-13

## 2017-06-12 RX ORDER — ONDANSETRON 2 MG/ML
8 INJECTION INTRAMUSCULAR; INTRAVENOUS AS NEEDED
Status: CANCELLED | OUTPATIENT
Start: 2017-06-13

## 2017-06-12 RX ORDER — EPINEPHRINE 1 MG/ML
0.3 INJECTION, SOLUTION, CONCENTRATE INTRAVENOUS AS NEEDED
Status: CANCELLED | OUTPATIENT
Start: 2017-06-13

## 2017-06-12 RX ORDER — ALBUTEROL SULFATE 0.83 MG/ML
2.5 SOLUTION RESPIRATORY (INHALATION) AS NEEDED
Status: CANCELLED
Start: 2017-06-13

## 2017-06-12 RX ORDER — HEPARIN SODIUM 1000 [USP'U]/ML
2000 INJECTION, SOLUTION INTRAVENOUS; SUBCUTANEOUS AS NEEDED
Status: CANCELLED
Start: 2017-06-13

## 2017-06-12 RX ORDER — HEPARIN 100 UNIT/ML
300-500 SYRINGE INTRAVENOUS AS NEEDED
Status: CANCELLED
Start: 2017-06-13

## 2017-06-13 ENCOUNTER — HOSPITAL ENCOUNTER (OUTPATIENT)
Dept: INFUSION THERAPY | Age: 72
Discharge: HOME | End: 2017-06-13
Payer: MEDICARE

## 2017-06-13 VITALS
TEMPERATURE: 97.8 F | DIASTOLIC BLOOD PRESSURE: 63 MMHG | OXYGEN SATURATION: 95 % | HEART RATE: 69 BPM | WEIGHT: 175.4 LBS | SYSTOLIC BLOOD PRESSURE: 120 MMHG | BODY MASS INDEX: 24.46 KG/M2 | RESPIRATION RATE: 18 BRPM

## 2017-06-13 DIAGNOSIS — C44.509 CARCINOMA OF ABDOMINAL WALL: ICD-10-CM

## 2017-06-13 LAB
ALBUMIN SERPL BCP-MCNC: 2.8 G/DL (ref 3.2–4.6)
ALBUMIN/GLOB SERPL: 0.7 {RATIO} (ref 1.2–3.5)
ALP SERPL-CCNC: 136 U/L (ref 50–136)
ALT SERPL-CCNC: 30 U/L (ref 12–65)
ANION GAP BLD CALC-SCNC: 7 MMOL/L (ref 7–16)
AST SERPL W P-5'-P-CCNC: 25 U/L (ref 15–37)
BASOPHILS # BLD AUTO: 0.1 K/UL (ref 0–0.2)
BASOPHILS # BLD: 1 % (ref 0–2)
BILIRUB SERPL-MCNC: 0.2 MG/DL (ref 0.2–1.1)
BUN SERPL-MCNC: 14 MG/DL (ref 8–23)
CALCIUM SERPL-MCNC: 8.8 MG/DL (ref 8.3–10.4)
CHLORIDE SERPL-SCNC: 109 MMOL/L (ref 98–107)
CO2 SERPL-SCNC: 27 MMOL/L (ref 21–32)
CREAT SERPL-MCNC: 0.82 MG/DL (ref 0.8–1.5)
DIFFERENTIAL METHOD BLD: ABNORMAL
EOSINOPHIL # BLD: 0.2 K/UL (ref 0–0.8)
EOSINOPHIL NFR BLD: 4 % (ref 0.5–7.8)
ERYTHROCYTE [DISTWIDTH] IN BLOOD BY AUTOMATED COUNT: 15.7 % (ref 11.9–14.6)
GLOBULIN SER CALC-MCNC: 4 G/DL (ref 2.3–3.5)
GLUCOSE SERPL-MCNC: 100 MG/DL (ref 65–100)
HCT VFR BLD AUTO: 34.8 % (ref 41.1–50.3)
HGB BLD-MCNC: 11.7 G/DL (ref 13.6–17.2)
LYMPHOCYTES # BLD AUTO: 17 % (ref 13–44)
LYMPHOCYTES # BLD: 1.1 K/UL (ref 0.5–4.6)
MCH RBC QN AUTO: 33.5 PG (ref 26.1–32.9)
MCHC RBC AUTO-ENTMCNC: 33.6 G/DL (ref 31.4–35)
MCV RBC AUTO: 99.7 FL (ref 79.6–97.8)
MONOCYTES # BLD: 0.7 K/UL (ref 0.1–1.3)
MONOCYTES NFR BLD AUTO: 12 % (ref 4–12)
NEUTS SEG # BLD: 4.1 K/UL (ref 1.7–8.2)
NEUTS SEG NFR BLD AUTO: 66 % (ref 43–78)
NRBC # BLD: 0 K/UL (ref 0–0.2)
NRBC BLD-RTO: 0 PER 100 WBC (ref 0–2)
PLATELET # BLD AUTO: 155 K/UL (ref 150–450)
PLATELET COMMENTS,PCOM: SLIGHT
PMV BLD AUTO: 10 FL (ref 10.8–14.1)
POTASSIUM SERPL-SCNC: 4 MMOL/L (ref 3.5–5.1)
PROT SERPL-MCNC: 6.8 G/DL (ref 6.3–8.2)
RBC # BLD AUTO: 3.49 M/UL (ref 4.23–5.67)
RBC MORPH BLD: ABNORMAL
SODIUM SERPL-SCNC: 143 MMOL/L (ref 136–145)
WBC # BLD AUTO: 6.2 K/UL (ref 4.3–11.1)
WBC MORPH BLD: ABNORMAL

## 2017-06-13 PROCEDURE — 96368 THER/DIAG CONCURRENT INF: CPT

## 2017-06-13 PROCEDURE — 80053 COMPREHEN METABOLIC PANEL: CPT | Performed by: INTERNAL MEDICINE

## 2017-06-13 PROCEDURE — 74011250636 HC RX REV CODE- 250/636: Performed by: NURSE PRACTITIONER

## 2017-06-13 PROCEDURE — 85025 COMPLETE CBC W/AUTO DIFF WBC: CPT | Performed by: INTERNAL MEDICINE

## 2017-06-13 PROCEDURE — 74011250636 HC RX REV CODE- 250/636

## 2017-06-13 PROCEDURE — 96413 CHEMO IV INFUSION 1 HR: CPT

## 2017-06-13 PROCEDURE — 96411 CHEMO IV PUSH ADDL DRUG: CPT

## 2017-06-13 PROCEDURE — 96375 TX/PRO/DX INJ NEW DRUG ADDON: CPT

## 2017-06-13 PROCEDURE — 77030003560 HC NDL HUBR BARD -A

## 2017-06-13 PROCEDURE — 74011000258 HC RX REV CODE- 258: Performed by: NURSE PRACTITIONER

## 2017-06-13 PROCEDURE — 96415 CHEMO IV INFUSION ADDL HR: CPT

## 2017-06-13 RX ORDER — DEXTROSE MONOHYDRATE 50 MG/ML
25 INJECTION, SOLUTION INTRAVENOUS CONTINUOUS
Status: ACTIVE | OUTPATIENT
Start: 2017-06-13 | End: 2017-06-14

## 2017-06-13 RX ORDER — FLUOROURACIL 50 MG/ML
320 INJECTION, SOLUTION INTRAVENOUS ONCE
Status: COMPLETED | OUTPATIENT
Start: 2017-06-13 | End: 2017-06-13

## 2017-06-13 RX ORDER — ONDANSETRON 2 MG/ML
8 INJECTION INTRAMUSCULAR; INTRAVENOUS ONCE
Status: COMPLETED | OUTPATIENT
Start: 2017-06-13 | End: 2017-06-13

## 2017-06-13 RX ORDER — DEXAMETHASONE SODIUM PHOSPHATE 100 MG/10ML
10 INJECTION INTRAMUSCULAR; INTRAVENOUS ONCE
Status: COMPLETED | OUTPATIENT
Start: 2017-06-13 | End: 2017-06-13

## 2017-06-13 RX ORDER — SODIUM CHLORIDE 0.9 % (FLUSH) 0.9 %
10 SYRINGE (ML) INJECTION AS NEEDED
Status: ACTIVE | OUTPATIENT
Start: 2017-06-13 | End: 2017-06-14

## 2017-06-13 RX ADMIN — ONDANSETRON 8 MG: 2 INJECTION INTRAMUSCULAR; INTRAVENOUS at 14:35

## 2017-06-13 RX ADMIN — Medication 10 ML: at 14:30

## 2017-06-13 RX ADMIN — OXALIPLATIN 156 MG: 5 INJECTION, SOLUTION, CONCENTRATE INTRAVENOUS at 15:10

## 2017-06-13 RX ADMIN — DEXAMETHASONE SODIUM PHOSPHATE 10 MG: 10 INJECTION INTRAMUSCULAR; INTRAVENOUS at 14:33

## 2017-06-13 RX ADMIN — DEXTROSE MONOHYDRATE 25 ML/HR: 5 INJECTION, SOLUTION INTRAVENOUS at 14:30

## 2017-06-13 RX ADMIN — LEUCOVORIN CALCIUM 624 MG: 350 INJECTION, POWDER, LYOPHILIZED, FOR SOLUTION INTRAMUSCULAR; INTRAVENOUS at 15:10

## 2017-06-13 RX ADMIN — FLUOROURACIL 624 MG: 50 INJECTION, SOLUTION INTRAVENOUS at 17:10

## 2017-06-13 NOTE — PROGRESS NOTES
Arrived to the [facility]. FOLFOX completed. Patient tolerated well. Any issues or concerns during appointment: None. Patient aware of next infusion appointment on June 15th at 1700. Discharged ambulatory.

## 2017-06-15 ENCOUNTER — HOSPITAL ENCOUNTER (OUTPATIENT)
Dept: INFUSION THERAPY | Age: 72
Discharge: HOME | End: 2017-06-15
Payer: MEDICARE

## 2017-06-15 VITALS
WEIGHT: 178.8 LBS | HEART RATE: 81 BPM | RESPIRATION RATE: 16 BRPM | SYSTOLIC BLOOD PRESSURE: 108 MMHG | DIASTOLIC BLOOD PRESSURE: 53 MMHG | BODY MASS INDEX: 24.94 KG/M2 | TEMPERATURE: 97.9 F | OXYGEN SATURATION: 94 %

## 2017-06-15 PROCEDURE — 96523 IRRIG DRUG DELIVERY DEVICE: CPT

## 2017-06-15 PROCEDURE — 74011250636 HC RX REV CODE- 250/636: Performed by: INTERNAL MEDICINE

## 2017-06-15 RX ORDER — HEPARIN 100 UNIT/ML
300-600 SYRINGE INTRAVENOUS AS NEEDED
Status: DISPENSED | OUTPATIENT
Start: 2017-06-15 | End: 2017-06-16

## 2017-06-15 RX ORDER — SODIUM CHLORIDE 0.9 % (FLUSH) 0.9 %
10-40 SYRINGE (ML) INJECTION AS NEEDED
Status: DISCONTINUED | OUTPATIENT
Start: 2017-06-15 | End: 2017-06-19 | Stop reason: HOSPADM

## 2017-06-15 RX ADMIN — Medication 10 ML: at 17:21

## 2017-06-15 RX ADMIN — SODIUM CHLORIDE, PRESERVATIVE FREE 500 UNITS: 5 INJECTION INTRAVENOUS at 17:22

## 2017-06-15 RX ADMIN — Medication 10 ML: at 17:22

## 2017-06-15 NOTE — PROGRESS NOTES
Pt arrived ambulatory for pump dc, he denies complaints. Positive blood return from [facility], de-accessed per protocol. Pt dc'd stable, to return to [facility] on 6/20 at 1115.

## 2017-06-20 ENCOUNTER — HOSPITAL ENCOUNTER (OUTPATIENT)
Dept: LAB | Age: 72
Discharge: HOME | End: 2017-06-20
Payer: MEDICARE

## 2017-06-20 ENCOUNTER — HOSPITAL ENCOUNTER (OUTPATIENT)
Dept: INFUSION THERAPY | Age: 72
Discharge: HOME | End: 2017-06-20
Payer: MEDICARE

## 2017-06-20 DIAGNOSIS — C18.9 COLON CARCINOMA (HCC): ICD-10-CM

## 2017-06-20 DIAGNOSIS — C44.509 CARCINOMA OF ABDOMINAL WALL: ICD-10-CM

## 2017-06-20 LAB
ALBUMIN SERPL BCP-MCNC: 3.3 G/DL (ref 3.2–4.6)
ALBUMIN/GLOB SERPL: 0.8 {RATIO} (ref 1.2–3.5)
ALP SERPL-CCNC: 127 U/L (ref 50–136)
ALT SERPL-CCNC: 30 U/L (ref 12–65)
ANION GAP BLD CALC-SCNC: 7 MMOL/L (ref 7–16)
AST SERPL W P-5'-P-CCNC: 29 U/L (ref 15–37)
BASOPHILS # BLD AUTO: 0 K/UL (ref 0–0.2)
BASOPHILS # BLD: 1 % (ref 0–2)
BILIRUB SERPL-MCNC: 0.6 MG/DL (ref 0.2–1.1)
BUN SERPL-MCNC: 25 MG/DL (ref 8–23)
CALCIUM SERPL-MCNC: 9 MG/DL (ref 8.3–10.4)
CHLORIDE SERPL-SCNC: 105 MMOL/L (ref 98–107)
CO2 SERPL-SCNC: 26 MMOL/L (ref 21–32)
CREAT SERPL-MCNC: 0.85 MG/DL (ref 0.8–1.5)
DIFFERENTIAL METHOD BLD: ABNORMAL
EOSINOPHIL # BLD: 0.1 K/UL (ref 0–0.8)
EOSINOPHIL NFR BLD: 4 % (ref 0.5–7.8)
ERYTHROCYTE [DISTWIDTH] IN BLOOD BY AUTOMATED COUNT: 14.2 % (ref 11.9–14.6)
GLOBULIN SER CALC-MCNC: 4.4 G/DL (ref 2.3–3.5)
GLUCOSE SERPL-MCNC: 109 MG/DL (ref 65–100)
HCT VFR BLD AUTO: 35.9 % (ref 41.1–50.3)
HGB BLD-MCNC: 12.4 G/DL (ref 13.6–17.2)
LYMPHOCYTES # BLD AUTO: 31 % (ref 13–44)
LYMPHOCYTES # BLD: 0.9 K/UL (ref 0.5–4.6)
MCH RBC QN AUTO: 33.6 PG (ref 26.1–32.9)
MCHC RBC AUTO-ENTMCNC: 34.5 G/DL (ref 31.4–35)
MCV RBC AUTO: 97.3 FL (ref 79.6–97.8)
MONOCYTES # BLD: 0.3 K/UL (ref 0.1–1.3)
MONOCYTES NFR BLD AUTO: 11 % (ref 4–12)
NEUTS SEG # BLD: 1.5 K/UL (ref 1.7–8.2)
NEUTS SEG NFR BLD AUTO: 53 % (ref 43–78)
NRBC # BLD: 0 K/UL (ref 0–0.2)
PLATELET # BLD AUTO: 141 K/UL (ref 150–450)
PMV BLD AUTO: 9.6 FL (ref 10.8–14.1)
POTASSIUM SERPL-SCNC: 4.1 MMOL/L (ref 3.5–5.1)
PROT SERPL-MCNC: 7.7 G/DL (ref 6.3–8.2)
RBC # BLD AUTO: 3.69 M/UL (ref 4.23–5.67)
SODIUM SERPL-SCNC: 138 MMOL/L (ref 136–145)
WBC # BLD AUTO: 2.8 K/UL (ref 4.3–11.1)

## 2017-06-20 PROCEDURE — 74011250636 HC RX REV CODE- 250/636: Performed by: NURSE PRACTITIONER

## 2017-06-20 PROCEDURE — 96413 CHEMO IV INFUSION 1 HR: CPT

## 2017-06-20 PROCEDURE — 85025 COMPLETE CBC W/AUTO DIFF WBC: CPT | Performed by: NURSE PRACTITIONER

## 2017-06-20 PROCEDURE — 96415 CHEMO IV INFUSION ADDL HR: CPT

## 2017-06-20 PROCEDURE — 80053 COMPREHEN METABOLIC PANEL: CPT | Performed by: NURSE PRACTITIONER

## 2017-06-20 PROCEDURE — 96411 CHEMO IV PUSH ADDL DRUG: CPT

## 2017-06-20 PROCEDURE — 74011000258 HC RX REV CODE- 258: Performed by: NURSE PRACTITIONER

## 2017-06-20 PROCEDURE — 74011250636 HC RX REV CODE- 250/636

## 2017-06-20 PROCEDURE — 96375 TX/PRO/DX INJ NEW DRUG ADDON: CPT

## 2017-06-20 PROCEDURE — 96368 THER/DIAG CONCURRENT INF: CPT

## 2017-06-20 RX ORDER — DEXTROSE MONOHYDRATE 50 MG/ML
25 INJECTION, SOLUTION INTRAVENOUS CONTINUOUS
Status: ACTIVE | OUTPATIENT
Start: 2017-06-20 | End: 2017-06-20

## 2017-06-20 RX ORDER — DEXAMETHASONE SODIUM PHOSPHATE 100 MG/10ML
10 INJECTION INTRAMUSCULAR; INTRAVENOUS ONCE
Status: COMPLETED | OUTPATIENT
Start: 2017-06-20 | End: 2017-06-20

## 2017-06-20 RX ORDER — ONDANSETRON 2 MG/ML
8 INJECTION INTRAMUSCULAR; INTRAVENOUS ONCE
Status: COMPLETED | OUTPATIENT
Start: 2017-06-20 | End: 2017-06-20

## 2017-06-20 RX ORDER — SODIUM CHLORIDE 0.9 % (FLUSH) 0.9 %
10 SYRINGE (ML) INJECTION AS NEEDED
Status: ACTIVE | OUTPATIENT
Start: 2017-06-20 | End: 2017-06-20

## 2017-06-20 RX ORDER — FLUOROURACIL 50 MG/ML
320 INJECTION, SOLUTION INTRAVENOUS ONCE
Status: COMPLETED | OUTPATIENT
Start: 2017-06-20 | End: 2017-06-20

## 2017-06-20 RX ADMIN — Medication 10 ML: at 11:38

## 2017-06-20 RX ADMIN — DEXTROSE MONOHYDRATE 25 ML/HR: 5 INJECTION, SOLUTION INTRAVENOUS at 11:42

## 2017-06-20 RX ADMIN — DEXAMETHASONE SODIUM PHOSPHATE 10 MG: 10 INJECTION INTRAMUSCULAR; INTRAVENOUS at 11:39

## 2017-06-20 RX ADMIN — FLUOROURACIL 624 MG: 50 INJECTION, SOLUTION INTRAVENOUS at 14:09

## 2017-06-20 RX ADMIN — LEUCOVORIN CALCIUM 624 MG: 350 INJECTION, POWDER, LYOPHILIZED, FOR SOLUTION INTRAMUSCULAR; INTRAVENOUS at 12:07

## 2017-06-20 RX ADMIN — OXALIPLATIN 156 MG: 5 INJECTION, SOLUTION, CONCENTRATE INTRAVENOUS at 12:07

## 2017-06-20 RX ADMIN — ONDANSETRON 8 MG: 2 INJECTION INTRAMUSCULAR; INTRAVENOUS at 11:38

## 2017-06-20 NOTE — PROGRESS NOTES
Arrived to [facility] to receive FOLFOX. Tolerated well. Issues or concerns during appointment: none. Aware of next appointment on 6/22 at 5:00 PM.  Patient discharged ambulatory with Adrucil CADD pump infusing.

## 2017-06-22 ENCOUNTER — HOSPITAL ENCOUNTER (OUTPATIENT)
Dept: INFUSION THERAPY | Age: 72
Discharge: HOME | End: 2017-06-22
Payer: MEDICARE

## 2017-06-22 VITALS
SYSTOLIC BLOOD PRESSURE: 130 MMHG | TEMPERATURE: 98.3 F | OXYGEN SATURATION: 95 % | RESPIRATION RATE: 16 BRPM | DIASTOLIC BLOOD PRESSURE: 65 MMHG | WEIGHT: 174 LBS | HEART RATE: 85 BPM | BODY MASS INDEX: 24.27 KG/M2

## 2017-06-22 PROCEDURE — 74011250636 HC RX REV CODE- 250/636: Performed by: INTERNAL MEDICINE

## 2017-06-22 PROCEDURE — 96523 IRRIG DRUG DELIVERY DEVICE: CPT

## 2017-06-22 RX ORDER — SODIUM CHLORIDE 0.9 % (FLUSH) 0.9 %
10 SYRINGE (ML) INJECTION AS NEEDED
Status: ACTIVE | OUTPATIENT
Start: 2017-06-22 | End: 2017-06-23

## 2017-06-22 RX ORDER — HEPARIN 100 UNIT/ML
500 SYRINGE INTRAVENOUS ONCE
Status: COMPLETED | OUTPATIENT
Start: 2017-06-22 | End: 2017-06-22

## 2017-06-22 RX ADMIN — SODIUM CHLORIDE, PRESERVATIVE FREE 500 UNITS: 5 INJECTION INTRAVENOUS at 17:15

## 2017-06-22 RX ADMIN — Medication 10 ML: at 17:15

## 2017-06-22 NOTE — PROGRESS NOTES
Arrived to the [facility]. Chemo pump d/c'd. Any issues or concerns during appointment: None. Patient aware of next infusion appointment on July 5th at 1315. Discharged ambulatory.

## 2017-06-27 ENCOUNTER — HOSPITAL ENCOUNTER (OUTPATIENT)
Dept: INFUSION THERAPY | Age: 72
Discharge: HOME | End: 2017-06-27
Payer: MEDICARE

## 2017-06-27 VITALS
BODY MASS INDEX: 23.57 KG/M2 | HEART RATE: 80 BPM | WEIGHT: 169 LBS | DIASTOLIC BLOOD PRESSURE: 59 MMHG | RESPIRATION RATE: 16 BRPM | OXYGEN SATURATION: 95 % | TEMPERATURE: 98.5 F | SYSTOLIC BLOOD PRESSURE: 116 MMHG

## 2017-06-27 DIAGNOSIS — C44.509 CARCINOMA OF ABDOMINAL WALL: ICD-10-CM

## 2017-06-27 LAB
ALBUMIN SERPL BCP-MCNC: 3.2 G/DL (ref 3.2–4.6)
ALBUMIN/GLOB SERPL: 0.8 {RATIO} (ref 1.2–3.5)
ALP SERPL-CCNC: 123 U/L (ref 50–136)
ALT SERPL-CCNC: 35 U/L (ref 12–65)
ANION GAP BLD CALC-SCNC: 8 MMOL/L (ref 7–16)
AST SERPL W P-5'-P-CCNC: 35 U/L (ref 15–37)
BASOPHILS # BLD AUTO: 0 K/UL (ref 0–0.2)
BASOPHILS # BLD: 1 % (ref 0–2)
BILIRUB SERPL-MCNC: 0.6 MG/DL (ref 0.2–1.1)
BUN SERPL-MCNC: 26 MG/DL (ref 8–23)
CALCIUM SERPL-MCNC: 9.8 MG/DL (ref 8.3–10.4)
CHLORIDE SERPL-SCNC: 103 MMOL/L (ref 98–107)
CO2 SERPL-SCNC: 27 MMOL/L (ref 21–32)
CREAT SERPL-MCNC: 0.94 MG/DL (ref 0.8–1.5)
DIFFERENTIAL METHOD BLD: ABNORMAL
EOSINOPHIL # BLD: 0.2 K/UL (ref 0–0.8)
EOSINOPHIL NFR BLD: 6 % (ref 0.5–7.8)
ERYTHROCYTE [DISTWIDTH] IN BLOOD BY AUTOMATED COUNT: 14.2 % (ref 11.9–14.6)
GLOBULIN SER CALC-MCNC: 4.1 G/DL (ref 2.3–3.5)
GLUCOSE SERPL-MCNC: 112 MG/DL (ref 65–100)
HCT VFR BLD AUTO: 34.6 % (ref 41.1–50.3)
HGB BLD-MCNC: 12.1 G/DL (ref 13.6–17.2)
LYMPHOCYTES # BLD AUTO: 16 % (ref 13–44)
LYMPHOCYTES # BLD: 0.5 K/UL (ref 0.5–4.6)
MAGNESIUM SERPL-MCNC: 1.7 MG/DL (ref 1.8–2.4)
MCH RBC QN AUTO: 33.2 PG (ref 26.1–32.9)
MCHC RBC AUTO-ENTMCNC: 35 G/DL (ref 31.4–35)
MCV RBC AUTO: 94.8 FL (ref 79.6–97.8)
MONOCYTES # BLD: 0.5 K/UL (ref 0.1–1.3)
MONOCYTES NFR BLD AUTO: 14 % (ref 4–12)
NEUTS SEG # BLD: 2.1 K/UL (ref 1.7–8.2)
NEUTS SEG NFR BLD AUTO: 62 % (ref 43–78)
NRBC # BLD: 0 K/UL (ref 0–0.2)
PLATELET # BLD AUTO: 111 K/UL (ref 150–450)
PMV BLD AUTO: 10.1 FL (ref 10.8–14.1)
POTASSIUM SERPL-SCNC: 3.5 MMOL/L (ref 3.5–5.1)
PROT SERPL-MCNC: 7.3 G/DL (ref 6.3–8.2)
RBC # BLD AUTO: 3.65 M/UL (ref 4.23–5.67)
SODIUM SERPL-SCNC: 138 MMOL/L (ref 136–145)
WBC # BLD AUTO: 3.3 K/UL (ref 4.3–11.1)

## 2017-06-27 PROCEDURE — 85025 COMPLETE CBC W/AUTO DIFF WBC: CPT | Performed by: INTERNAL MEDICINE

## 2017-06-27 PROCEDURE — 80053 COMPREHEN METABOLIC PANEL: CPT | Performed by: INTERNAL MEDICINE

## 2017-06-27 PROCEDURE — 83735 ASSAY OF MAGNESIUM: CPT | Performed by: INTERNAL MEDICINE

## 2017-06-27 PROCEDURE — 74011250636 HC RX REV CODE- 250/636: Performed by: INTERNAL MEDICINE

## 2017-06-27 PROCEDURE — 96360 HYDRATION IV INFUSION INIT: CPT

## 2017-06-27 PROCEDURE — 77030003560 HC NDL HUBR BARD -A

## 2017-06-27 RX ORDER — HEPARIN 100 UNIT/ML
500 SYRINGE INTRAVENOUS AS NEEDED
Status: DISPENSED | OUTPATIENT
Start: 2017-06-27 | End: 2017-06-27

## 2017-06-27 RX ORDER — SODIUM CHLORIDE 0.9 % (FLUSH) 0.9 %
10 SYRINGE (ML) INJECTION AS NEEDED
Status: DISCONTINUED | OUTPATIENT
Start: 2017-06-27 | End: 2017-07-01 | Stop reason: HOSPADM

## 2017-06-27 RX ORDER — SODIUM CHLORIDE 9 MG/ML
1000 INJECTION, SOLUTION INTRAVENOUS CONTINUOUS
Status: DISCONTINUED | OUTPATIENT
Start: 2017-06-27 | End: 2017-07-01 | Stop reason: HOSPADM

## 2017-06-27 RX ADMIN — Medication 10 ML: at 11:10

## 2017-06-27 RX ADMIN — SODIUM CHLORIDE 1000 ML: 900 INJECTION, SOLUTION INTRAVENOUS at 10:09

## 2017-06-27 RX ADMIN — SODIUM CHLORIDE, PRESERVATIVE FREE 500 UNITS: 5 INJECTION INTRAVENOUS at 11:10

## 2017-06-27 NOTE — PROGRESS NOTES
Problem: Knowledge Deficit  Goal: *Verbalizes understanding of procedures and medications  Outcome: Progressing Towards Goal  Reviewed hydration POc with patient. Verbalizes understanding.

## 2017-06-27 NOTE — PROGRESS NOTES
Arrived to unit for IVF today. Reports vomiting episode x 6 on Saturday and diarrhea episodes  2-3 x daily over past 2 days. Reports not drinking fluids well due to diminished appetite. Reports fatigue. Labs drawn and IVF initiated.

## 2017-06-27 NOTE — PROGRESS NOTES
Tolerated 1 liter IVF today. Patient took own Zofran prior to arrival for this appointment. Reports feeling better since IVF. Directed patient on use of antiemetics medications and antidiarrheal medications. Patient is to obtain generic Prilosec that MD called in for him for c/o indigestion earlier this week. Directed to push oral hydration. Verbalizes understanding. Patient discharged via ambulation accompanied by family. Instructed to notify physician of any problems, questions or concerns. Allowed opportunity for patient/family to ask questions. Verbalized understanding. Next appointment is 07/05/17 at [phone] with Amirah with labs and MD visit immediately prior.

## 2017-06-30 ENCOUNTER — HOSPITAL ENCOUNTER (OUTPATIENT)
Dept: INFUSION THERAPY | Age: 72
Discharge: HOME | End: 2017-06-30
Payer: MEDICARE

## 2017-06-30 VITALS
OXYGEN SATURATION: 96 % | RESPIRATION RATE: 16 BRPM | HEART RATE: 74 BPM | TEMPERATURE: 97.9 F | DIASTOLIC BLOOD PRESSURE: 57 MMHG | SYSTOLIC BLOOD PRESSURE: 94 MMHG | WEIGHT: 169 LBS | BODY MASS INDEX: 23.57 KG/M2

## 2017-06-30 DIAGNOSIS — C44.509 CARCINOMA OF ABDOMINAL WALL: ICD-10-CM

## 2017-06-30 LAB
ALBUMIN SERPL BCP-MCNC: 2.7 G/DL (ref 3.2–4.6)
ALBUMIN/GLOB SERPL: 0.7 {RATIO} (ref 1.2–3.5)
ALP SERPL-CCNC: 137 U/L (ref 50–136)
ALT SERPL-CCNC: 39 U/L (ref 12–65)
ANION GAP BLD CALC-SCNC: 7 MMOL/L (ref 7–16)
AST SERPL W P-5'-P-CCNC: 34 U/L (ref 15–37)
BASOPHILS # BLD AUTO: 0 K/UL (ref 0–0.2)
BASOPHILS # BLD: 1 % (ref 0–2)
BILIRUB SERPL-MCNC: 0.5 MG/DL (ref 0.2–1.1)
BUN SERPL-MCNC: 17 MG/DL (ref 8–23)
CALCIUM SERPL-MCNC: 8.2 MG/DL (ref 8.3–10.4)
CHLORIDE SERPL-SCNC: 103 MMOL/L (ref 98–107)
CO2 SERPL-SCNC: 27 MMOL/L (ref 21–32)
CREAT SERPL-MCNC: 0.79 MG/DL (ref 0.8–1.5)
DIFFERENTIAL METHOD BLD: ABNORMAL
EOSINOPHIL # BLD: 0.1 K/UL (ref 0–0.8)
EOSINOPHIL NFR BLD: 2 % (ref 0.5–7.8)
ERYTHROCYTE [DISTWIDTH] IN BLOOD BY AUTOMATED COUNT: 14.8 % (ref 11.9–14.6)
GLOBULIN SER CALC-MCNC: 4 G/DL (ref 2.3–3.5)
GLUCOSE SERPL-MCNC: 104 MG/DL (ref 65–100)
HCT VFR BLD AUTO: 32.5 % (ref 41.1–50.3)
HGB BLD-MCNC: 11.6 G/DL (ref 13.6–17.2)
LYMPHOCYTES # BLD AUTO: 17 % (ref 13–44)
LYMPHOCYTES # BLD: 0.6 K/UL (ref 0.5–4.6)
MAGNESIUM SERPL-MCNC: 1.4 MG/DL (ref 1.8–2.4)
MCH RBC QN AUTO: 33.4 PG (ref 26.1–32.9)
MCHC RBC AUTO-ENTMCNC: 35.7 G/DL (ref 31.4–35)
MCV RBC AUTO: 93.7 FL (ref 79.6–97.8)
MONOCYTES # BLD: 1.1 K/UL (ref 0.1–1.3)
MONOCYTES NFR BLD AUTO: 28 % (ref 4–12)
NEUTS SEG # BLD: 2 K/UL (ref 1.7–8.2)
NEUTS SEG NFR BLD AUTO: 52 % (ref 43–78)
NRBC # BLD: 0.01 K/UL (ref 0–0.2)
PLATELET # BLD AUTO: 102 K/UL (ref 150–450)
PLATELET COMMENTS,PCOM: SLIGHT
PMV BLD AUTO: 10 FL (ref 10.8–14.1)
POTASSIUM SERPL-SCNC: 2.7 MMOL/L (ref 3.5–5.1)
PROT SERPL-MCNC: 6.7 G/DL (ref 6.3–8.2)
RBC # BLD AUTO: 3.47 M/UL (ref 4.23–5.67)
RBC MORPH BLD: ABNORMAL
SODIUM SERPL-SCNC: 137 MMOL/L (ref 136–145)
WBC # BLD AUTO: 3.8 K/UL (ref 4.3–11.1)
WBC MORPH BLD: ABNORMAL

## 2017-06-30 PROCEDURE — 96361 HYDRATE IV INFUSION ADD-ON: CPT

## 2017-06-30 PROCEDURE — 74011250636 HC RX REV CODE- 250/636: Performed by: INTERNAL MEDICINE

## 2017-06-30 PROCEDURE — 85025 COMPLETE CBC W/AUTO DIFF WBC: CPT | Performed by: INTERNAL MEDICINE

## 2017-06-30 PROCEDURE — 96365 THER/PROPH/DIAG IV INF INIT: CPT

## 2017-06-30 PROCEDURE — 83735 ASSAY OF MAGNESIUM: CPT | Performed by: INTERNAL MEDICINE

## 2017-06-30 PROCEDURE — 80053 COMPREHEN METABOLIC PANEL: CPT | Performed by: INTERNAL MEDICINE

## 2017-06-30 PROCEDURE — 96366 THER/PROPH/DIAG IV INF ADDON: CPT

## 2017-06-30 RX ORDER — HEPARIN 100 UNIT/ML
500 SYRINGE INTRAVENOUS AS NEEDED
Status: ACTIVE | OUTPATIENT
Start: 2017-06-30 | End: 2017-07-01

## 2017-06-30 RX ORDER — SODIUM CHLORIDE 9 MG/ML
1000 INJECTION, SOLUTION INTRAVENOUS ONCE
Status: CANCELLED
Start: 2017-06-30 | End: 2017-06-30

## 2017-06-30 RX ORDER — SODIUM CHLORIDE 0.9 % (FLUSH) 0.9 %
10 SYRINGE (ML) INJECTION EVERY 8 HOURS
Status: DISCONTINUED | OUTPATIENT
Start: 2017-06-30 | End: 2017-07-04 | Stop reason: HOSPADM

## 2017-06-30 RX ORDER — SODIUM CHLORIDE 9 MG/ML
1000 INJECTION, SOLUTION INTRAVENOUS ONCE
Status: COMPLETED | OUTPATIENT
Start: 2017-06-30 | End: 2017-06-30

## 2017-06-30 RX ADMIN — SODIUM CHLORIDE 1000 ML: 900 INJECTION, SOLUTION INTRAVENOUS at 12:35

## 2017-06-30 RX ADMIN — POTASSIUM CHLORIDE: 2 INJECTION, SOLUTION, CONCENTRATE INTRAVENOUS at 14:00

## 2017-06-30 RX ADMIN — Medication 10 ML: at 12:15

## 2017-06-30 RX ADMIN — SODIUM CHLORIDE, PRESERVATIVE FREE 500 UNITS: 5 INJECTION INTRAVENOUS at 18:12

## 2017-06-30 RX ADMIN — Medication 10 ML: at 18:10

## 2017-06-30 NOTE — PROGRESS NOTES
Arrived to infusion  Add On , family called triage to report diarrhea,lack of fluid or solid intake,weakness  Patient lethargic . Labs drawn,patient unable to give stool specimen while here today  2L NS infused,4o meq kcl,Mg 4gm  Specimen kit sent home with patient,instructions reviewed with son and patient. Encouraged to use anti emetic consistently for next couple days and prn.  Also  Encouraged to use immodium for inconsistent diarrhea  Verbalized understanding  Patient alert after fluids  Next appt 7/5

## 2017-07-05 ENCOUNTER — HOSPITAL ENCOUNTER (OUTPATIENT)
Dept: LAB | Age: 72
Discharge: HOME | End: 2017-07-05
Payer: MEDICARE

## 2017-07-05 ENCOUNTER — HOSPITAL ENCOUNTER (OUTPATIENT)
Dept: INFUSION THERAPY | Age: 72
Discharge: HOME | End: 2017-07-05
Payer: MEDICARE

## 2017-07-05 DIAGNOSIS — C44.509 CARCINOMA OF ABDOMINAL WALL: ICD-10-CM

## 2017-07-05 LAB
ALBUMIN SERPL BCP-MCNC: 2.5 G/DL (ref 3.2–4.6)
ALBUMIN/GLOB SERPL: 0.6 {RATIO} (ref 1.2–3.5)
ALP SERPL-CCNC: 146 U/L (ref 50–136)
ALT SERPL-CCNC: 31 U/L (ref 12–65)
ANION GAP BLD CALC-SCNC: 9 MMOL/L (ref 7–16)
AST SERPL W P-5'-P-CCNC: 30 U/L (ref 15–37)
BASOPHILS # BLD AUTO: 0.1 K/UL (ref 0–0.2)
BASOPHILS # BLD: 2 % (ref 0–2)
BILIRUB SERPL-MCNC: 0.4 MG/DL (ref 0.2–1.1)
BUN SERPL-MCNC: 19 MG/DL (ref 8–23)
CALCIUM SERPL-MCNC: 8.7 MG/DL (ref 8.3–10.4)
CHLORIDE SERPL-SCNC: 107 MMOL/L (ref 98–107)
CO2 SERPL-SCNC: 27 MMOL/L (ref 21–32)
CREAT SERPL-MCNC: 0.61 MG/DL (ref 0.8–1.5)
DIFFERENTIAL METHOD BLD: ABNORMAL
EOSINOPHIL # BLD: 0.3 K/UL (ref 0–0.8)
EOSINOPHIL NFR BLD: 8 % (ref 0.5–7.8)
ERYTHROCYTE [DISTWIDTH] IN BLOOD BY AUTOMATED COUNT: 15.6 % (ref 11.9–14.6)
GLOBULIN SER CALC-MCNC: 4 G/DL (ref 2.3–3.5)
GLUCOSE SERPL-MCNC: 92 MG/DL (ref 65–100)
HCT VFR BLD AUTO: 32.6 % (ref 41.1–50.3)
HGB BLD-MCNC: 11.5 G/DL (ref 13.6–17.2)
LYMPHOCYTES # BLD AUTO: 25 % (ref 13–44)
LYMPHOCYTES # BLD: 1 K/UL (ref 0.5–4.6)
MAGNESIUM SERPL-MCNC: 1.7 MG/DL (ref 1.8–2.4)
MCH RBC QN AUTO: 33 PG (ref 26.1–32.9)
MCHC RBC AUTO-ENTMCNC: 35.3 G/DL (ref 31.4–35)
MCV RBC AUTO: 93.4 FL (ref 79.6–97.8)
MONOCYTES # BLD: 0.9 K/UL (ref 0.1–1.3)
MONOCYTES NFR BLD AUTO: 24 % (ref 4–12)
NEUTS SEG # BLD: 1.5 K/UL (ref 1.7–8.2)
NEUTS SEG NFR BLD AUTO: 41 % (ref 43–78)
NRBC # BLD: 0 K/UL (ref 0–0.2)
PLATELET # BLD AUTO: 141 K/UL (ref 150–450)
PMV BLD AUTO: 9.8 FL (ref 10.8–14.1)
POTASSIUM SERPL-SCNC: 2.8 MMOL/L (ref 3.5–5.1)
PROT SERPL-MCNC: 6.5 G/DL (ref 6.3–8.2)
RBC # BLD AUTO: 3.49 M/UL (ref 4.23–5.67)
SODIUM SERPL-SCNC: 143 MMOL/L (ref 136–145)
WBC # BLD AUTO: 3.8 K/UL (ref 4.3–11.1)

## 2017-07-05 PROCEDURE — 80053 COMPREHEN METABOLIC PANEL: CPT | Performed by: INTERNAL MEDICINE

## 2017-07-05 PROCEDURE — 85025 COMPLETE CBC W/AUTO DIFF WBC: CPT | Performed by: INTERNAL MEDICINE

## 2017-07-05 PROCEDURE — 83735 ASSAY OF MAGNESIUM: CPT | Performed by: INTERNAL MEDICINE

## 2017-07-05 PROCEDURE — 96366 THER/PROPH/DIAG IV INF ADDON: CPT

## 2017-07-05 PROCEDURE — 96365 THER/PROPH/DIAG IV INF INIT: CPT

## 2017-07-05 PROCEDURE — 74011250636 HC RX REV CODE- 250/636: Performed by: INTERNAL MEDICINE

## 2017-07-05 RX ORDER — HEPARIN 100 UNIT/ML
300-500 SYRINGE INTRAVENOUS AS NEEDED
Status: DISCONTINUED | OUTPATIENT
Start: 2017-07-05 | End: 2017-07-09 | Stop reason: HOSPADM

## 2017-07-05 RX ORDER — MAGNESIUM SULFATE HEPTAHYDRATE 40 MG/ML
2 INJECTION, SOLUTION INTRAVENOUS ONCE
Status: DISCONTINUED | OUTPATIENT
Start: 2017-07-05 | End: 2017-07-05 | Stop reason: SDUPTHER

## 2017-07-05 RX ORDER — POTASSIUM CHLORIDE 29.8 MG/ML
40 INJECTION INTRAVENOUS ONCE
Status: DISCONTINUED | OUTPATIENT
Start: 2017-07-05 | End: 2017-07-05 | Stop reason: SDUPTHER

## 2017-07-05 RX ORDER — SODIUM CHLORIDE 0.9 % (FLUSH) 0.9 %
10-30 SYRINGE (ML) INJECTION AS NEEDED
Status: DISCONTINUED | OUTPATIENT
Start: 2017-07-05 | End: 2017-07-09 | Stop reason: HOSPADM

## 2017-07-05 RX ADMIN — Medication 500 UNITS: at 16:06

## 2017-07-05 RX ADMIN — POTASSIUM CHLORIDE: 2 INJECTION, SOLUTION, CONCENTRATE INTRAVENOUS at 12:00

## 2017-07-05 RX ADMIN — Medication 10 ML: at 16:06

## 2017-07-06 NOTE — PROGRESS NOTES
Arrived to the [facility]. IV NS with 4o meq KCL and 2 gms magnesium completed. Patient tolerated well. Any issues or concerns during appointment: NO. Pt given  Specimen cup to collect stool specimen for c diff. Pt will bring to lab when collected at home. Patient aware of next infusion appointment on 07/12/17 (date) at [address] (time). Discharged ambulatory .

## 2017-07-07 ENCOUNTER — APPOINTMENT (OUTPATIENT)
Dept: INFUSION THERAPY | Age: 72
End: 2017-07-07
Payer: MEDICARE

## 2017-07-10 ENCOUNTER — HOSPITAL ENCOUNTER (OUTPATIENT)
Dept: LAB | Age: 72
Discharge: HOME | End: 2017-07-10
Attending: INTERNAL MEDICINE

## 2017-07-10 DIAGNOSIS — R19.7 DIARRHEA, UNSPECIFIED TYPE: ICD-10-CM

## 2017-07-19 ENCOUNTER — HOSPITAL ENCOUNTER (OUTPATIENT)
Dept: INFUSION THERAPY | Age: 72
Discharge: HOME | End: 2017-07-19
Payer: MEDICARE

## 2017-07-19 ENCOUNTER — HOSPITAL ENCOUNTER (OUTPATIENT)
Dept: CT IMAGING | Age: 72
Discharge: HOME | End: 2017-07-19
Attending: NURSE PRACTITIONER
Payer: MEDICARE

## 2017-07-19 VITALS
SYSTOLIC BLOOD PRESSURE: 112 MMHG | RESPIRATION RATE: 16 BRPM | OXYGEN SATURATION: 95 % | BODY MASS INDEX: 23.29 KG/M2 | HEART RATE: 103 BPM | DIASTOLIC BLOOD PRESSURE: 65 MMHG | WEIGHT: 167 LBS | TEMPERATURE: 98.4 F

## 2017-07-19 DIAGNOSIS — C44.509 CARCINOMA OF ABDOMINAL WALL: ICD-10-CM

## 2017-07-19 DIAGNOSIS — R10.30 LOWER ABDOMINAL PAIN: ICD-10-CM

## 2017-07-19 LAB
ALBUMIN SERPL BCP-MCNC: 2.4 G/DL (ref 3.2–4.6)
ALBUMIN/GLOB SERPL: 0.5 {RATIO} (ref 1.2–3.5)
ALP SERPL-CCNC: 195 U/L (ref 50–136)
ALT SERPL-CCNC: 29 U/L (ref 12–65)
ANION GAP BLD CALC-SCNC: 8 MMOL/L (ref 7–16)
AST SERPL W P-5'-P-CCNC: 31 U/L (ref 15–37)
BASOPHILS # BLD AUTO: 0 K/UL (ref 0–0.2)
BASOPHILS # BLD: 0 % (ref 0–2)
BILIRUB SERPL-MCNC: 0.5 MG/DL (ref 0.2–1.1)
BUN SERPL-MCNC: 14 MG/DL (ref 8–23)
CALCIUM SERPL-MCNC: 8.4 MG/DL (ref 8.3–10.4)
CHLORIDE SERPL-SCNC: 102 MMOL/L (ref 98–107)
CO2 SERPL-SCNC: 28 MMOL/L (ref 21–32)
CREAT SERPL-MCNC: 0.85 MG/DL (ref 0.8–1.5)
DIFFERENTIAL METHOD BLD: ABNORMAL
EOSINOPHIL # BLD: 0.1 K/UL (ref 0–0.8)
EOSINOPHIL NFR BLD: 1 % (ref 0.5–7.8)
ERYTHROCYTE [DISTWIDTH] IN BLOOD BY AUTOMATED COUNT: 16.1 % (ref 11.9–14.6)
GLOBULIN SER CALC-MCNC: 4.6 G/DL (ref 2.3–3.5)
GLUCOSE SERPL-MCNC: 116 MG/DL (ref 65–100)
HCT VFR BLD AUTO: 32.9 % (ref 41.1–50.3)
HGB BLD-MCNC: 11.2 G/DL (ref 13.6–17.2)
LYMPHOCYTES # BLD AUTO: 12 % (ref 13–44)
LYMPHOCYTES # BLD: 1 K/UL (ref 0.5–4.6)
MAGNESIUM SERPL-MCNC: 1.8 MG/DL (ref 1.8–2.4)
MCH RBC QN AUTO: 32.4 PG (ref 26.1–32.9)
MCHC RBC AUTO-ENTMCNC: 34 G/DL (ref 31.4–35)
MCV RBC AUTO: 95.1 FL (ref 79.6–97.8)
MONOCYTES # BLD: 1.2 K/UL (ref 0.1–1.3)
MONOCYTES NFR BLD AUTO: 14 % (ref 4–12)
NEUTS SEG # BLD: 5.9 K/UL (ref 1.7–8.2)
NEUTS SEG NFR BLD AUTO: 72 % (ref 43–78)
NRBC # BLD: 0.01 K/UL (ref 0–0.2)
PLATELET # BLD AUTO: 239 K/UL (ref 150–450)
PMV BLD AUTO: 9.4 FL (ref 10.8–14.1)
POTASSIUM SERPL-SCNC: 3.7 MMOL/L (ref 3.5–5.1)
PROT SERPL-MCNC: 7 G/DL (ref 6.3–8.2)
RBC # BLD AUTO: 3.46 M/UL (ref 4.23–5.67)
SODIUM SERPL-SCNC: 138 MMOL/L (ref 136–145)
WBC # BLD AUTO: 8.2 K/UL (ref 4.3–11.1)

## 2017-07-19 PROCEDURE — 77030003560 HC NDL HUBR BARD -A

## 2017-07-19 PROCEDURE — 80053 COMPREHEN METABOLIC PANEL: CPT | Performed by: INTERNAL MEDICINE

## 2017-07-19 PROCEDURE — 96360 HYDRATION IV INFUSION INIT: CPT

## 2017-07-19 PROCEDURE — 83735 ASSAY OF MAGNESIUM: CPT | Performed by: INTERNAL MEDICINE

## 2017-07-19 PROCEDURE — 85025 COMPLETE CBC W/AUTO DIFF WBC: CPT | Performed by: INTERNAL MEDICINE

## 2017-07-19 PROCEDURE — 74011636320 HC RX REV CODE- 636/320: Performed by: NURSE PRACTITIONER

## 2017-07-19 PROCEDURE — 36415 COLL VENOUS BLD VENIPUNCTURE: CPT | Performed by: INTERNAL MEDICINE

## 2017-07-19 PROCEDURE — 74011000258 HC RX REV CODE- 258: Performed by: NURSE PRACTITIONER

## 2017-07-19 PROCEDURE — 74177 CT ABD & PELVIS W/CONTRAST: CPT

## 2017-07-19 PROCEDURE — 74011250636 HC RX REV CODE- 250/636: Performed by: NURSE PRACTITIONER

## 2017-07-19 RX ORDER — HEPARIN 100 UNIT/ML
500 SYRINGE INTRAVENOUS AS NEEDED
Status: DISPENSED | OUTPATIENT
Start: 2017-07-19 | End: 2017-07-19

## 2017-07-19 RX ORDER — SODIUM CHLORIDE 0.9 % (FLUSH) 0.9 %
10 SYRINGE (ML) INJECTION
Status: COMPLETED | OUTPATIENT
Start: 2017-07-19 | End: 2017-07-19

## 2017-07-19 RX ORDER — SODIUM CHLORIDE 9 MG/ML
1000 INJECTION, SOLUTION INTRAVENOUS ONCE
Status: COMPLETED | OUTPATIENT
Start: 2017-07-19 | End: 2017-07-19

## 2017-07-19 RX ADMIN — SODIUM CHLORIDE 100 ML: 900 INJECTION, SOLUTION INTRAVENOUS at 19:00

## 2017-07-19 RX ADMIN — SODIUM CHLORIDE, PRESERVATIVE FREE 500 UNITS: 5 INJECTION INTRAVENOUS at 16:08

## 2017-07-19 RX ADMIN — SODIUM CHLORIDE 1000 ML: 900 INJECTION, SOLUTION INTRAVENOUS at 15:00

## 2017-07-19 RX ADMIN — Medication 10 ML: at 19:00

## 2017-07-19 RX ADMIN — IOPAMIDOL 100 ML: 755 INJECTION, SOLUTION INTRAVENOUS at 19:00

## 2017-07-19 NOTE — PROGRESS NOTES
Arrived to the [facility] ambulatory. hydration completed. Patient tolerated well. Any issues or concerns during appointment: yes, pt C/O abd pain and diarrhea. Pt seen carlos Olivo today, pt going to [facility] side for CT scan today after hydration. Patient aware of next infusion appointment on 7/25 at 18  Discharged to [address] scan with his daughter in law. Claude Day

## 2017-07-21 ENCOUNTER — HOSPITAL ENCOUNTER (OUTPATIENT)
Dept: INFUSION THERAPY | Age: 72
Discharge: HOME | End: 2017-07-21
Payer: MEDICARE

## 2017-07-21 VITALS
RESPIRATION RATE: 16 BRPM | OXYGEN SATURATION: 97 % | HEART RATE: 79 BPM | DIASTOLIC BLOOD PRESSURE: 59 MMHG | TEMPERATURE: 98 F | SYSTOLIC BLOOD PRESSURE: 95 MMHG | WEIGHT: 168 LBS | BODY MASS INDEX: 23.43 KG/M2

## 2017-07-21 PROCEDURE — 96360 HYDRATION IV INFUSION INIT: CPT

## 2017-07-21 PROCEDURE — 96361 HYDRATE IV INFUSION ADD-ON: CPT

## 2017-07-21 PROCEDURE — 77030003560 HC NDL HUBR BARD -A

## 2017-07-21 PROCEDURE — 74011250636 HC RX REV CODE- 250/636: Performed by: INTERNAL MEDICINE

## 2017-07-21 RX ORDER — SODIUM CHLORIDE 9 MG/ML
1000 INJECTION, SOLUTION INTRAVENOUS ONCE
Status: COMPLETED | OUTPATIENT
Start: 2017-07-21 | End: 2017-07-21

## 2017-07-21 RX ORDER — HEPARIN 100 UNIT/ML
500 SYRINGE INTRAVENOUS AS NEEDED
Status: DISPENSED | OUTPATIENT
Start: 2017-07-21 | End: 2017-07-22

## 2017-07-21 RX ADMIN — SODIUM CHLORIDE, PRESERVATIVE FREE 500 UNITS: 5 INJECTION INTRAVENOUS at 18:21

## 2017-07-21 RX ADMIN — SODIUM CHLORIDE 1000 ML: 900 INJECTION, SOLUTION INTRAVENOUS at 17:20

## 2017-07-21 NOTE — PROGRESS NOTES
Pt arrived via wheelchair for hydration, he c/o feeling dizzy on standing at times. Abdominal pain and diarrhea continue. Pt will continue with antibiotics and pain control prescribed on 7/19. Pt tolerated infusion without incident. Pt dc'd stable, to return to [facility] on 7/25 at 1315.

## 2017-07-25 ENCOUNTER — HOSPITAL ENCOUNTER (OUTPATIENT)
Dept: LAB | Age: 72
Discharge: HOME | End: 2017-07-25
Payer: MEDICARE

## 2017-07-25 ENCOUNTER — HOSPITAL ENCOUNTER (OUTPATIENT)
Dept: INFUSION THERAPY | Age: 72
End: 2017-07-25
Payer: MEDICARE

## 2017-07-25 DIAGNOSIS — C18.9 COLON CARCINOMA (HCC): ICD-10-CM

## 2017-07-25 LAB
ALBUMIN SERPL BCP-MCNC: 2.3 G/DL (ref 3.2–4.6)
ALBUMIN/GLOB SERPL: 0.5 {RATIO} (ref 1.2–3.5)
ALP SERPL-CCNC: 204 U/L (ref 50–136)
ALT SERPL-CCNC: 26 U/L (ref 12–65)
ANION GAP BLD CALC-SCNC: 5 MMOL/L (ref 7–16)
AST SERPL W P-5'-P-CCNC: 38 U/L (ref 15–37)
BASOPHILS # BLD AUTO: 0 K/UL (ref 0–0.2)
BASOPHILS # BLD: 1 % (ref 0–2)
BILIRUB SERPL-MCNC: 0.3 MG/DL (ref 0.2–1.1)
BUN SERPL-MCNC: 11 MG/DL (ref 8–23)
CALCIUM SERPL-MCNC: 8.8 MG/DL (ref 8.3–10.4)
CHLORIDE SERPL-SCNC: 105 MMOL/L (ref 98–107)
CO2 SERPL-SCNC: 29 MMOL/L (ref 21–32)
CREAT SERPL-MCNC: 0.68 MG/DL (ref 0.8–1.5)
DIFFERENTIAL METHOD BLD: ABNORMAL
EOSINOPHIL # BLD: 0.2 K/UL (ref 0–0.8)
EOSINOPHIL NFR BLD: 5 % (ref 0.5–7.8)
ERYTHROCYTE [DISTWIDTH] IN BLOOD BY AUTOMATED COUNT: 15.9 % (ref 11.9–14.6)
GLOBULIN SER CALC-MCNC: 4.9 G/DL (ref 2.3–3.5)
GLUCOSE SERPL-MCNC: 151 MG/DL (ref 65–100)
HCT VFR BLD AUTO: 33.8 % (ref 41.1–50.3)
HGB BLD-MCNC: 11.3 G/DL (ref 13.6–17.2)
LYMPHOCYTES # BLD AUTO: 21 % (ref 13–44)
LYMPHOCYTES # BLD: 1 K/UL (ref 0.5–4.6)
MAGNESIUM SERPL-MCNC: 2.1 MG/DL (ref 1.8–2.4)
MCH RBC QN AUTO: 32 PG (ref 26.1–32.9)
MCHC RBC AUTO-ENTMCNC: 33.4 G/DL (ref 31.4–35)
MCV RBC AUTO: 95.8 FL (ref 79.6–97.8)
MONOCYTES # BLD: 0.6 K/UL (ref 0.1–1.3)
MONOCYTES NFR BLD AUTO: 13 % (ref 4–12)
NEUTS SEG # BLD: 2.7 K/UL (ref 1.7–8.2)
NEUTS SEG NFR BLD AUTO: 60 % (ref 43–78)
NRBC # BLD: 0 K/UL (ref 0–0.2)
PLATELET # BLD AUTO: 304 K/UL (ref 150–450)
PMV BLD AUTO: 9.3 FL (ref 10.8–14.1)
POTASSIUM SERPL-SCNC: 4.1 MMOL/L (ref 3.5–5.1)
PROT SERPL-MCNC: 7.2 G/DL (ref 6.3–8.2)
RBC # BLD AUTO: 3.53 M/UL (ref 4.23–5.67)
SODIUM SERPL-SCNC: 139 MMOL/L (ref 136–145)
WBC # BLD AUTO: 4.5 K/UL (ref 4.3–11.1)

## 2017-07-25 PROCEDURE — 85025 COMPLETE CBC W/AUTO DIFF WBC: CPT | Performed by: INTERNAL MEDICINE

## 2017-07-25 PROCEDURE — 83735 ASSAY OF MAGNESIUM: CPT | Performed by: INTERNAL MEDICINE

## 2017-07-25 PROCEDURE — 80053 COMPREHEN METABOLIC PANEL: CPT | Performed by: INTERNAL MEDICINE

## 2017-07-27 ENCOUNTER — APPOINTMENT (OUTPATIENT)
Dept: INFUSION THERAPY | Age: 72
End: 2017-07-27
Payer: MEDICARE

## 2017-08-08 ENCOUNTER — HOSPITAL ENCOUNTER (OUTPATIENT)
Dept: LAB | Age: 72
Discharge: HOME | End: 2017-08-08
Payer: MEDICARE

## 2017-08-08 DIAGNOSIS — C18.9 COLON CARCINOMA (HCC): ICD-10-CM

## 2017-08-08 LAB
ALBUMIN SERPL BCP-MCNC: 2.7 G/DL (ref 3.2–4.6)
ALBUMIN/GLOB SERPL: 0.6 {RATIO} (ref 1.2–3.5)
ALP SERPL-CCNC: 162 U/L (ref 50–136)
ALT SERPL-CCNC: 46 U/L (ref 12–65)
ANION GAP BLD CALC-SCNC: 3 MMOL/L (ref 7–16)
AST SERPL W P-5'-P-CCNC: 36 U/L (ref 15–37)
BASOPHILS # BLD AUTO: 0.1 K/UL (ref 0–0.2)
BASOPHILS # BLD: 1 % (ref 0–2)
BILIRUB SERPL-MCNC: 0.4 MG/DL (ref 0.2–1.1)
BUN SERPL-MCNC: 15 MG/DL (ref 8–23)
CALCIUM SERPL-MCNC: 9.1 MG/DL (ref 8.3–10.4)
CHLORIDE SERPL-SCNC: 108 MMOL/L (ref 98–107)
CO2 SERPL-SCNC: 27 MMOL/L (ref 21–32)
CREAT SERPL-MCNC: 0.6 MG/DL (ref 0.8–1.5)
DIFFERENTIAL METHOD BLD: ABNORMAL
EOSINOPHIL # BLD: 0.1 K/UL (ref 0–0.8)
EOSINOPHIL NFR BLD: 3 % (ref 0.5–7.8)
ERYTHROCYTE [DISTWIDTH] IN BLOOD BY AUTOMATED COUNT: 16 % (ref 11.9–14.6)
GLOBULIN SER CALC-MCNC: 4.8 G/DL (ref 2.3–3.5)
GLUCOSE SERPL-MCNC: 93 MG/DL (ref 65–100)
HCT VFR BLD AUTO: 34.6 % (ref 41.1–50.3)
HGB BLD-MCNC: 11.6 G/DL (ref 13.6–17.2)
LYMPHOCYTES # BLD AUTO: 19 % (ref 13–44)
LYMPHOCYTES # BLD: 1 K/UL (ref 0.5–4.6)
MCH RBC QN AUTO: 32.8 PG (ref 26.1–32.9)
MCHC RBC AUTO-ENTMCNC: 33.5 G/DL (ref 31.4–35)
MCV RBC AUTO: 97.7 FL (ref 79.6–97.8)
MONOCYTES # BLD: 0.8 K/UL (ref 0.1–1.3)
MONOCYTES NFR BLD AUTO: 15 % (ref 4–12)
NEUTS SEG # BLD: 3.4 K/UL (ref 1.7–8.2)
NEUTS SEG NFR BLD AUTO: 62 % (ref 43–78)
NRBC # BLD: 0 K/UL (ref 0–0.2)
PLATELET # BLD AUTO: 229 K/UL (ref 150–450)
PMV BLD AUTO: 9.3 FL (ref 10.8–14.1)
POTASSIUM SERPL-SCNC: 4 MMOL/L (ref 3.5–5.1)
PROT SERPL-MCNC: 7.5 G/DL (ref 6.3–8.2)
RBC # BLD AUTO: 3.54 M/UL (ref 4.23–5.67)
SODIUM SERPL-SCNC: 138 MMOL/L (ref 136–145)
WBC # BLD AUTO: 5.4 K/UL (ref 4.3–11.1)

## 2017-08-08 PROCEDURE — 85025 COMPLETE CBC W/AUTO DIFF WBC: CPT | Performed by: INTERNAL MEDICINE

## 2017-08-08 PROCEDURE — 80053 COMPREHEN METABOLIC PANEL: CPT | Performed by: INTERNAL MEDICINE

## 2017-11-09 ENCOUNTER — HOSPITAL ENCOUNTER (OUTPATIENT)
Dept: LAB | Age: 72
Discharge: HOME | End: 2017-11-09
Payer: MEDICARE

## 2017-11-09 DIAGNOSIS — C44.509 CARCINOMA OF ABDOMINAL WALL: ICD-10-CM

## 2017-11-09 DIAGNOSIS — C18.9 COLON CARCINOMA (HCC): ICD-10-CM

## 2017-11-09 DIAGNOSIS — Z45.2 ENCOUNTER FOR CARE RELATED TO PORT-A-CATH: ICD-10-CM

## 2017-11-09 LAB
ALBUMIN SERPL-MCNC: 3.3 G/DL (ref 3.2–4.6)
ALBUMIN/GLOB SERPL: 0.8 {RATIO} (ref 1.2–3.5)
ALP SERPL-CCNC: 134 U/L (ref 50–136)
ALT SERPL-CCNC: 23 U/L (ref 12–65)
ANION GAP SERPL CALC-SCNC: 6 MMOL/L (ref 7–16)
AST SERPL-CCNC: 22 U/L (ref 15–37)
BASOPHILS # BLD: 0 K/UL (ref 0–0.2)
BASOPHILS NFR BLD: 1 % (ref 0–2)
BILIRUB SERPL-MCNC: 0.4 MG/DL (ref 0.2–1.1)
BUN SERPL-MCNC: 17 MG/DL (ref 8–23)
CALCIUM SERPL-MCNC: 9 MG/DL (ref 8.3–10.4)
CEA SERPL-MCNC: 2.6 NG/ML (ref 0–3)
CHLORIDE SERPL-SCNC: 105 MMOL/L (ref 98–107)
CO2 SERPL-SCNC: 31 MMOL/L (ref 21–32)
CREAT SERPL-MCNC: 0.79 MG/DL (ref 0.8–1.5)
DIFFERENTIAL METHOD BLD: ABNORMAL
EOSINOPHIL # BLD: 0.2 K/UL (ref 0–0.8)
EOSINOPHIL NFR BLD: 5 % (ref 0.5–7.8)
ERYTHROCYTE [DISTWIDTH] IN BLOOD BY AUTOMATED COUNT: 13.1 % (ref 11.9–14.6)
GLOBULIN SER CALC-MCNC: 4.2 G/DL (ref 2.3–3.5)
GLUCOSE SERPL-MCNC: 90 MG/DL (ref 65–100)
HCT VFR BLD AUTO: 37.7 % (ref 41.1–50.3)
HGB BLD-MCNC: 12.7 G/DL (ref 13.6–17.2)
LYMPHOCYTES # BLD: 1.2 K/UL (ref 0.5–4.6)
LYMPHOCYTES NFR BLD: 27 % (ref 13–44)
MCH RBC QN AUTO: 32 PG (ref 26.1–32.9)
MCHC RBC AUTO-ENTMCNC: 33.7 G/DL (ref 31.4–35)
MCV RBC AUTO: 95 FL (ref 79.6–97.8)
MONOCYTES # BLD: 0.7 K/UL (ref 0.1–1.3)
MONOCYTES NFR BLD: 15 % (ref 4–12)
NEUTS SEG # BLD: 2.3 K/UL (ref 1.7–8.2)
NEUTS SEG NFR BLD: 52 % (ref 43–78)
NRBC # BLD: 0 K/UL (ref 0–0.2)
PLATELET # BLD AUTO: 173 K/UL (ref 150–450)
PMV BLD AUTO: 9.4 FL (ref 10.8–14.1)
POTASSIUM SERPL-SCNC: 3.8 MMOL/L (ref 3.5–5.1)
PROT SERPL-MCNC: 7.5 G/DL (ref 6.3–8.2)
RBC # BLD AUTO: 3.97 M/UL (ref 4.23–5.67)
SODIUM SERPL-SCNC: 142 MMOL/L (ref 136–145)
WBC # BLD AUTO: 4.4 K/UL (ref 4.3–11.1)

## 2017-11-09 PROCEDURE — 85025 COMPLETE CBC W/AUTO DIFF WBC: CPT | Performed by: INTERNAL MEDICINE

## 2017-11-09 PROCEDURE — 80053 COMPREHEN METABOLIC PANEL: CPT | Performed by: INTERNAL MEDICINE

## 2017-11-09 PROCEDURE — 82378 CARCINOEMBRYONIC ANTIGEN: CPT | Performed by: INTERNAL MEDICINE

## 2017-12-13 ENCOUNTER — ANESTHESIA EVENT (OUTPATIENT)
Dept: SURGERY | Age: 72
End: 2017-12-13
Payer: MEDICARE

## 2017-12-13 ENCOUNTER — HOSPITAL ENCOUNTER (OUTPATIENT)
Dept: INTERVENTIONAL RADIOLOGY/VASCULAR | Age: 72
Discharge: HOME | End: 2017-12-13
Attending: INTERNAL MEDICINE | Admitting: RADIOLOGY
Payer: MEDICARE

## 2017-12-13 ENCOUNTER — ANESTHESIA (OUTPATIENT)
Dept: SURGERY | Age: 72
End: 2017-12-13
Payer: MEDICARE

## 2017-12-13 VITALS
TEMPERATURE: 98 F | RESPIRATION RATE: 16 BRPM | HEART RATE: 74 BPM | DIASTOLIC BLOOD PRESSURE: 70 MMHG | OXYGEN SATURATION: 95 % | SYSTOLIC BLOOD PRESSURE: 120 MMHG

## 2017-12-13 DIAGNOSIS — C44.509 CARCINOMA OF ABDOMINAL WALL: ICD-10-CM

## 2017-12-13 PROCEDURE — 74011250636 HC RX REV CODE- 250/636

## 2017-12-13 PROCEDURE — 77030031131 HC SUT MXN P COVD -B

## 2017-12-13 PROCEDURE — 36590 REMOVAL TUNNELED CV CATH: CPT

## 2017-12-13 PROCEDURE — 77030031139 HC SUT VCRL2 J&J -A

## 2017-12-13 PROCEDURE — 77030010507 HC ADH SKN DERMBND J&J -B

## 2017-12-13 PROCEDURE — 76060000032 HC ANESTHESIA 0.5 TO 1 HR: Performed by: RADIOLOGY

## 2017-12-13 PROCEDURE — 74011000250 HC RX REV CODE- 250: Performed by: PHYSICIAN ASSISTANT

## 2017-12-13 RX ORDER — MIDAZOLAM HYDROCHLORIDE 1 MG/ML
INJECTION, SOLUTION INTRAMUSCULAR; INTRAVENOUS AS NEEDED
Status: DISCONTINUED | OUTPATIENT
Start: 2017-12-13 | End: 2017-12-13 | Stop reason: HOSPADM

## 2017-12-13 RX ORDER — LIDOCAINE HYDROCHLORIDE AND EPINEPHRINE 20; 5 MG/ML; UG/ML
2-20 INJECTION, SOLUTION EPIDURAL; INFILTRATION; INTRACAUDAL; PERINEURAL ONCE
Status: COMPLETED | OUTPATIENT
Start: 2017-12-13 | End: 2017-12-13

## 2017-12-13 RX ORDER — LIDOCAINE HYDROCHLORIDE AND EPINEPHRINE 20; 5 MG/ML; UG/ML
1.5 INJECTION, SOLUTION EPIDURAL; INFILTRATION; INTRACAUDAL; PERINEURAL ONCE
Status: DISCONTINUED | OUTPATIENT
Start: 2017-12-13 | End: 2017-12-13

## 2017-12-13 RX ORDER — PROPOFOL 10 MG/ML
INJECTION, EMULSION INTRAVENOUS AS NEEDED
Status: DISCONTINUED | OUTPATIENT
Start: 2017-12-13 | End: 2017-12-13 | Stop reason: HOSPADM

## 2017-12-13 RX ORDER — MIDAZOLAM HYDROCHLORIDE 1 MG/ML
INJECTION, SOLUTION INTRAMUSCULAR; INTRAVENOUS AS NEEDED
Status: DISCONTINUED | OUTPATIENT
Start: 2017-12-13 | End: 2017-12-13

## 2017-12-13 RX ORDER — SODIUM CHLORIDE, SODIUM LACTATE, POTASSIUM CHLORIDE, CALCIUM CHLORIDE 600; 310; 30; 20 MG/100ML; MG/100ML; MG/100ML; MG/100ML
INJECTION, SOLUTION INTRAVENOUS
Status: DISCONTINUED | OUTPATIENT
Start: 2017-12-13 | End: 2017-12-13 | Stop reason: HOSPADM

## 2017-12-13 RX ADMIN — MIDAZOLAM HYDROCHLORIDE 2 MG: 1 INJECTION, SOLUTION INTRAMUSCULAR; INTRAVENOUS at 09:02

## 2017-12-13 RX ADMIN — LIDOCAINE HYDROCHLORIDE,EPINEPHRINE BITARTRATE 60 MG: 20; .005 INJECTION, SOLUTION EPIDURAL; INFILTRATION; INTRACAUDAL; PERINEURAL at 09:12

## 2017-12-13 RX ADMIN — SODIUM CHLORIDE, SODIUM LACTATE, POTASSIUM CHLORIDE, CALCIUM CHLORIDE: 600; 310; 30; 20 INJECTION, SOLUTION INTRAVENOUS at 08:25

## 2017-12-13 RX ADMIN — PROPOFOL 30 MG: 10 INJECTION, EMULSION INTRAVENOUS at 09:17

## 2017-12-13 RX ADMIN — PROPOFOL 50 MG: 10 INJECTION, EMULSION INTRAVENOUS at 09:06

## 2017-12-13 NOTE — ANESTHESIA PREPROCEDURE EVALUATION
Anesthetic History   No history of anesthetic complications            Review of Systems / Medical History  Patient summary reviewed and pertinent labs reviewed    Pulmonary          Smoker         Neuro/Psych   Within defined limits           Cardiovascular                  Exercise tolerance: >4 METS  Comments: H/o of multiple PE's   GI/Hepatic/Renal  Within defined limits              Endo/Other             Other Findings   Comments: H/o colon CA s/p chemo    Given his longterm tobacco use I presume he has some degree of undiagnosed copd.            Physical Exam    Airway  Mallampati: III  TM Distance: 4 - 6 cm  Neck ROM: normal range of motion   Mouth opening: Normal     Cardiovascular    Rhythm: regular  Rate: normal      Pertinent negatives: No murmur, JVD and peripheral edema   Dental         Pulmonary  Breath sounds clear to auscultation               Abdominal         Other Findings            Anesthetic Plan    ASA: 2  Anesthesia type: total IV anesthesia          Induction: Intravenous  Anesthetic plan and risks discussed with: Patient

## 2017-12-13 NOTE — PROGRESS NOTES
IR Nurse Pre-Procedure Checklist Part 2          Consent signed: Yes    H&P complete:  Yes    Antibiotics: Not applicable    Airway/Mallampati Done: anesthesia    Shaved: Yes    Pregnancy Form:Not applicable    Patient Position: Yes    MD Side: Yes     Biopsy Worksheet: Not applicable    Specimen Medium: Not applicable

## 2017-12-13 NOTE — PROCEDURES
Department of Interventional Radiology  [phone]        Interventional Radiology Brief Procedure Note    Patient: Santosh De La Torre MRN: [id number]  SSN: [id number]    YOB: 1945  Age: 71 y.o.   Sex: male      Date of Procedure: 12/13/2017    Pre-Procedure Diagnosis: colon cancer    Post-Procedure Diagnosis: SAME    Procedure(s): Venous Chest Port Removal    Brief Description of Procedure: as above    Performed By: Janeth Mas PA-C     Assistants: None    Anesthesia:Moderate Sedation    Estimated Blood Loss: None    Specimens: None    Implants: None    Findings: well healed chest port incision    Complications: None    Recommendations: bedrest     Follow Up: referring MD    Signed By: Janeth Mas PA-C     December 13, 2017

## 2017-12-13 NOTE — PROGRESS NOTES
Pt in IR for procedure. Pt receiving anesthesia for this procedure. Christine Hook in with pt and will resume care of this procedure. See OR notes.

## 2017-12-13 NOTE — PROGRESS NOTES
TRANSFER - OUT REPORT:    Dr. Lester Franz aware pt is ready for release from anesthesia. MD will see pt momentarily. Son at bsd. Verbal report given to Ralph CUADRA(name) on Reesa Abo for routine progression of care       Report consisted of patients Situation, Background, Assessment and   Recommendations(SBAR). Information from the following report(s) Procedure Summary and MAR was reviewed with the receiving nurse. Lines:   Peripheral IV 12/13/17 Left Forearm (Active)        Opportunity for questions and clarification was provided.

## 2017-12-13 NOTE — H&P
Department of Interventional Radiology  [phone]    History and Physical    Patient:  Shayy Tate MRN:  [id number]  SSN:  [id number]    YOB: 1945  Age:  71 y.o. Sex:  male      Primary Care Provider:  Jeannine Conner MD  Referring Physician:  Lillie Kahn MD    Subjective:     Chief Complaint: port removal    History of the Present Illness: The patient is a 71 y.o. male who presents for venous chest port removal.  No further therapy for colon cancer. Off Eliquis. NPO. Past Medical History:   Diagnosis Date    Anxiety and depression     manage well with Klonopin    Cancer ([facility] [address]     tumor removed from colon    History of staph infection 2013    to R hand- treated    Long term (current) use of anticoagulants     Eliquis    Prostate hypertrophy     Pulmonary embolism (HCC)     hx of- started on Eliquis    Smoker     1 pk/day x since age 26    Unspecified disorder of intestine      Past Surgical History:   Procedure Laterality Date    HX TONSIL AND ADENOIDECTOMY  at age 4   [place] TUMOR REMOVAL      removed from colon        Review of Systems:    Pertinent items are noted in the History of Present Illness.     Current Facility-Administered Medications   Medication Dose Route Frequency    lidocaine-EPINEPHrine (PF) (XYLOCAINE) 2 %-1:200,000 injection  mg  2-20 mL SubCUTAneous ONCE        No Known Allergies    Family History   Problem Relation Age of Onset    Cancer Mother      Pancreatic and Liver    No Known Problems Father     Heart Disease Brother     Heart Attack Brother     No Known Problems Brother      Social History   Substance Use Topics    Smoking status: Former Smoker     Packs/day: 2.00     Years: 50.00     Types: Cigarettes     Quit date: 12/13/2016    Smokeless tobacco: Never Used    Alcohol use No        Objective:       Physical Examination:    Vitals:    12/13/17 0821   BP: 140/81   Pulse: 69   Resp: 18   Temp: 97.9 °F (36.6 °C)   SpO2: 98% Blood pressure 140/81, pulse 69, temperature 97.9 °F (36.6 °C), resp. rate 18, SpO2 98 %.   HEART: regular rate and rhythm  LUNG: clear to auscultation bilaterally  ABDOMEN: normal findings: soft, non-tender  EXTREMITIES: normal strength, tone, and muscle mass, no deformities, no erythema, induration, or nodules    Laboratory:     Lab Results   Component Value Date/Time    Sodium 142 11/09/2017 03:08 PM    Sodium 138 08/08/2017 10:25 AM    Potassium 3.8 11/09/2017 03:08 PM    Potassium 4.0 08/08/2017 10:25 AM    Chloride 105 11/09/2017 03:08 PM    Chloride 108 08/08/2017 10:25 AM    CO2 31 11/09/2017 03:08 PM    CO2 27 08/08/2017 10:25 AM    Anion gap 6 11/09/2017 03:08 PM    Anion gap 3 08/08/2017 10:25 AM    Glucose 90 11/09/2017 03:08 PM    Glucose 93 08/08/2017 10:25 AM    BUN 17 11/09/2017 03:08 PM    BUN 15 08/08/2017 10:25 AM    Creatinine 0.79 11/09/2017 03:08 PM    Creatinine 0.60 08/08/2017 10:25 AM    GFR est AA >60 11/09/2017 03:08 PM    GFR est AA >60 08/08/2017 10:25 AM    GFR est non-AA >60 11/09/2017 03:08 PM    GFR est non-AA >60 08/08/2017 10:25 AM    Calcium 9.0 11/09/2017 03:08 PM    Calcium 9.1 08/08/2017 10:25 AM    Magnesium 2.1 07/25/2017 12:39 PM    Magnesium 1.8 07/19/2017 02:25 PM    Albumin 3.3 11/09/2017 03:08 PM    Albumin 2.7 08/08/2017 10:25 AM    Protein, total 7.5 11/09/2017 03:08 PM    Protein, total 7.5 08/08/2017 10:25 AM    Globulin 4.2 11/09/2017 03:08 PM    Globulin 4.8 08/08/2017 10:25 AM    A-G Ratio 0.8 11/09/2017 03:08 PM    A-G Ratio 0.6 08/08/2017 10:25 AM    AST (SGOT) 22 11/09/2017 03:08 PM    AST (SGOT) 36 08/08/2017 10:25 AM    ALT (SGPT) 23 11/09/2017 03:08 PM    ALT (SGPT) 46 08/08/2017 10:25 AM     Lab Results   Component Value Date/Time    WBC 4.4 11/09/2017 03:08 PM    WBC 5.4 08/08/2017 10:25 AM    HGB 12.7 11/09/2017 03:08 PM    HGB 11.6 08/08/2017 10:25 AM    HCT 37.7 11/09/2017 03:08 PM    HCT 34.6 08/08/2017 10:25 AM    PLATELET 013 18/28/3583 03:08 PM PLATELET 045 06/50/2720 10:25 AM     Lab Results   Component Value Date/Time    aPTT 28.5 03/15/2017 10:23 AM    aPTT 32.0 12/12/2016 09:45 AM    Prothrombin time 11.2 03/15/2017 10:23 AM    Prothrombin time 11.5 12/12/2016 09:45 AM    INR 1.1 03/15/2017 10:23 AM    INR 1.1 12/12/2016 09:45 AM       Assessment:     Colon cancer    Plan:     Planned Procedure:  Port removal    Risks, benefits, and alternatives reviewed with patient and he agrees to proceed with the procedure.       Signed By: Kevin Pierce PA-C     December 13, 2017

## 2017-12-13 NOTE — IP AVS SNAPSHOT
Adrienne Gandhi 
 
 
 [address] [facility] [address] [facility] 
[phone] Patient: Esmer Santoyo MRN: [id number] :1945 About your hospitalization You were admitted on:  2017 You last received care in the:  [facility] Radiology Specials You were discharged on:  2017 Why you were hospitalized Your primary diagnosis was:  Not on File Discharge Orders None A check scott indicates which time of day the medication should be taken. My Medications ASK your physician about these medications Instructions Each Dose to Equal  
 Morning Noon Evening Bedtime  
 apixaban 5 mg tablet Commonly known as:  Gale Rivera Your last dose was: Your next dose is: Take 1 Tab by mouth two (2) times a day. Indications: Pulmonary Thromboembolism 5 mg  
    
   
   
   
  
 clonazePAM 0.5 mg tablet Commonly known as:  Chris Schwab Your last dose was: Your next dose is: Take 1 Tab by mouth three (3) times daily as needed. Max Daily Amount: 1.5 mg.  
 0.5 mg  
    
   
   
   
  
 diphenoxylate-atropine 2.5-0.025 mg per tablet Commonly known as:  LOMOTIL Your last dose was: Your next dose is: Take 2 Tabs by mouth four (4) times daily as needed for Diarrhea. Max Daily Amount: 8 Tabs. 2 Tab  
    
   
   
   
  
 escitalopram oxalate 10 mg tablet Commonly known as:  Susannah Kong Your last dose was: Your next dose is: Take 1 Tab by mouth daily. 10 mg  
    
   
   
   
  
 lidocaine-prilocaine topical cream  
Commonly known as:  EMLA Your last dose was: Your next dose is:    
   
   
 Apply  to affected area as needed. Apply 1/2 inch amount of cream to port site 90 minutes prior to needle access. omeprazole 20 mg capsule Commonly known as:  PRILOSEC Your last dose was: Your next dose is: Take 1 Cap by mouth daily. 20 mg  
    
   
   
   
  
 ondansetron 8 mg disintegrating tablet Commonly known as:  ZOFRAN ODT Your last dose was: Your next dose is: Take 8 mg by mouth every eight (8) hours as needed for Nausea. 8 mg  
    
   
   
   
  
 oxyCODONE-acetaminophen 5-325 mg per tablet Commonly known as:  PERCOCET Your last dose was: Your next dose is:    
   
   
 take 1 to 2 tablets by mouth every 4 hours if needed for pain  
     
   
   
   
  
 predniSONE 20 mg tablet Commonly known as:  Reino Marino Your last dose was: Your next dose is: Take 1 Tab by mouth daily. Indications: diverticulitis 20 mg  
    
   
   
   
  
 prochlorperazine 10 mg tablet Commonly known as:  COMPAZINE Your last dose was: Your next dose is: Take 1 Tab by mouth every six (6) hours as needed. 10 mg  
    
   
   
   
  
 tamsulosin 0.4 mg capsule Commonly known as:  FLOMAX Your last dose was: Your next dose is: Take 1 Cap by mouth nightly. 0.4 mg  
    
   
   
   
  
 traMADol 50 mg tablet Commonly known as:  ULTRAM  
   
Your last dose was: Your next dose is: Take 1 Tab by mouth every six (6) hours as needed for Pain. Max Daily Amount: 200 mg. Indications: Pain 50 mg Discharge Instructions [address] [facility] [address] Department of Interventional Radiology [facility] 
[phone] Office 
[phone] Fax DRAIN/PORT/CATHETER REMOVAL DISCHARGE INSTRUCTIONS General Information: 
   Your doctor has ordered for us to remove your drain, port, or catheter. This could be that you do not need it anymore, it is not doing its job, your physician has decided on another plan for your treatment and/or it may need replacing. Home Care Instructions: You can resume your regular diet and medication regimen. Do not drink alcohol, drive, or make any important legal decisions in the next 24 hours. Do not lift anything heavier than a gallon of milk, or do anything strenuous for the next 24 hours. The nurse who discharges you to home should review with you any wound care instructions. Resume your normal level of activity slowly. You may shower after 24 hours, but do not take a bath, swim or immerse yourself in water until the site has healed. Call If: 
   You should call your Physician and/or the Radiology Nurse if you have any bleeding. Call if you have any signs of infection, fever, or increased pain at the site. Follow-Up Instructions: Please see your ordering doctor as he/she has requested. To Reach Us: If you have any questions about your procedure, please call the Interventional Radiology department at [phone]. After business hours (5pm) and weekends, call the answering service at [phone] and ask for the Radiologist on call to be paged. Interventional Radiology General Nurse Discharge After general anesthesia or intravenous sedation, for 24 hours or while taking prescription Narcotics: · Limit your activities · Do not drive and operate hazardous machinery · Do not make important personal or business decisions · Do  not drink alcoholic beverages · If you have not urinated within 8 hours after discharge, please contact your surgeon on call. * Please give a list of your current medications to your Primary Care Provider. * Please update this list whenever your medications are discontinued, doses are 
   changed, or new medications (including over-the-counter products) are added. * Please carry medication information at all times in case of emergency situations. These are general instructions for a healthy lifestyle: No smoking/ No tobacco products/ Avoid exposure to second hand smoke Surgeon General's Warning:  Quitting smoking now greatly reduces serious risk to your health. Obesity, smoking, and sedentary lifestyle greatly increases your risk for illness A healthy diet, regular physical exercise & weight monitoring are important for maintaining a healthy lifestyle You may be retaining fluid if you have a history of heart failure or if you experience any of the following symptoms:  Weight gain of 3 pounds or more overnight or 5 pounds in a week, increased swelling in our hands or feet or shortness of breath while lying flat in bed. Please call your doctor as soon as you notice any of these symptoms; do not wait until your next office visit. Recognize signs and symptoms of STROKE: 
F-face looks uneven A-arms unable to move or move unevenly S-speech slurred or non-existent T-time-call 911 as soon as signs and symptoms begin-DO NOT go Back to bed or wait to see if you get better-TIME IS BRAIN. Patient Signature: 
Date: 12/13/2017 Discharging Nurse: Eduar Hernandez RN Introducing [facility]! Kirsten Sandoval introduces [employer] patient portal. Now you can access parts of your medical record, email your doctor's office, and request medication refills online. 1. In your internet browser, go to [email or web address] 2. Click on the First Time User? Click Here link in the Sign In box. You will see the New Member Sign Up page. 3. Enter your [employer] Access Code exactly as it appears below. You will not need to use this code after youve completed the sign-up process. If you do not sign up before the expiration date, you must request a new code. · [employer] Access Code: [facility] Expires: 2/6/2018 10:17 AM 
 
4. Enter the last four digits of your Social Security Number (xxxx) and Date of Birth (mm/dd/yyyy) as indicated and click Submit. You will be taken to the next sign-up page. 5. Create a [employer] ID. This will be your [employer] login ID and cannot be changed, so think of one that is secure and easy to remember. 6. Create a [employer] password. You can change your password at any time. 7. Enter your Password Reset Question and Answer. This can be used at a later time if you forget your password. 8. Enter your e-mail address. You will receive e-mail notification when new information is available in [address] 9. Click Sign Up. You can now view and download portions of your medical record. 10. Click the Download Summary menu link to download a portable copy of your medical information. If you have questions, please visit the Frequently Asked Questions section of the [employer] website. Remember, [employer] is NOT to be used for urgent needs. For medical emergencies, dial 911. Now available from your iPhone and Android! Unresulted Labs-Please follow up with your PCP about these lab tests Order Current Status IR REMOVE TUNL CVAD W PORT/PUMP In process Providers Seen During Your Hospitalization Provider Specialty Primary office phone Maliha Fatima MD Hematology [phone] Your Primary Care Physician (PCP) Primary Care Physician Office Phone Office Fax Rico Vizcaino [phone] You are allergic to the following No active allergies Recent Documentation Smoking Status Former Smoker Emergency Contacts Name Discharge Info Relation Home Work Mobile [address] CAREGIVER [3] Spouse [3] [phone] [address] CAREGIVER [3] Son [22] [phone] Patient Belongings The following personal items are in your possession at time of discharge: 
     Visual Aid: Glasses, With patient Please provide this summary of care documentation to your next provider. Signatures-by signing, you are acknowledging that this After Visit Summary has been reviewed with you and you have received a copy. Patient Signature:  ____________________________________________________________ Date:  ____________________________________________________________  
  
Hitesh Kip Provider Signature:  ____________________________________________________________ Date:  ____________________________________________________________

## 2017-12-13 NOTE — IP AVS SNAPSHOT
[facility] 
 
 
 [address] [facility] [address] [facility] 
[phone] Patient: Maritza Gerardo MRN: [id number] :1945 My Medications ASK your physician about these medications Instructions Each Dose to Equal  
 Morning Noon Evening Bedtime  
 apixaban 5 mg tablet Commonly known as:  Ghislaine Joshua Your last dose was: Your next dose is: Take 1 Tab by mouth two (2) times a day. Indications: Pulmonary Thromboembolism 5 mg  
    
   
   
   
  
 clonazePAM 0.5 mg tablet Commonly known as:  Brendan Michael Your last dose was: Your next dose is: Take 1 Tab by mouth three (3) times daily as needed. Max Daily Amount: 1.5 mg.  
 0.5 mg  
    
   
   
   
  
 diphenoxylate-atropine 2.5-0.025 mg per tablet Commonly known as:  LOMOTIL Your last dose was: Your next dose is: Take 2 Tabs by mouth four (4) times daily as needed for Diarrhea. Max Daily Amount: 8 Tabs. 2 Tab  
    
   
   
   
  
 escitalopram oxalate 10 mg tablet Commonly known as:  Diogo Felix Your last dose was: Your next dose is: Take 1 Tab by mouth daily. 10 mg  
    
   
   
   
  
 lidocaine-prilocaine topical cream  
Commonly known as:  EMLA Your last dose was: Your next dose is:    
   
   
 Apply  to affected area as needed. Apply 1/2 inch amount of cream to port site 90 minutes prior to needle access. omeprazole 20 mg capsule Commonly known as:  PRILOSEC Your last dose was: Your next dose is: Take 1 Cap by mouth daily. 20 mg  
    
   
   
   
  
 ondansetron 8 mg disintegrating tablet Commonly known as:  ZOFRAN ODT Your last dose was: Your next dose is: Take 8 mg by mouth every eight (8) hours as needed for Nausea. 8 mg  
    
   
   
   
  
 oxyCODONE-acetaminophen 5-325 mg per tablet Commonly known as:  PERCOCET Your last dose was: Your next dose is:    
   
   
 take 1 to 2 tablets by mouth every 4 hours if needed for pain  
     
   
   
   
  
 predniSONE 20 mg tablet Commonly known as:  Bosben Spittle Your last dose was: Your next dose is: Take 1 Tab by mouth daily. Indications: diverticulitis 20 mg  
    
   
   
   
  
 prochlorperazine 10 mg tablet Commonly known as:  COMPAZINE Your last dose was: Your next dose is: Take 1 Tab by mouth every six (6) hours as needed. 10 mg  
    
   
   
   
  
 tamsulosin 0.4 mg capsule Commonly known as:  FLOMAX Your last dose was: Your next dose is: Take 1 Cap by mouth nightly. 0.4 mg  
    
   
   
   
  
 traMADol 50 mg tablet Commonly known as:  ULTRAM  
   
Your last dose was: Your next dose is: Take 1 Tab by mouth every six (6) hours as needed for Pain. Max Daily Amount: 200 mg. Indications: Pain  50 mg

## 2017-12-13 NOTE — PROGRESS NOTES
Pt received to room 5 for recovery. TRANSFER - IN REPORT:    Verbal report received from [facility]) on Rosado Mews for routine post - op      Report consisted of patients Situation, Background, Assessment and   Recommendations(SBAR). Information from the following report(s) MAR was reviewed with the receiving nurse. Opportunity for questions and clarification was provided. Assessment completed upon patients arrival to unit and care assumed.

## 2017-12-13 NOTE — DISCHARGE INSTRUCTIONS
[address]  Department of Interventional Radiology  [facility]  [phone] Office  [phone] Fax    DRAIN/PORT/CATHETER REMOVAL  DISCHARGE INSTRUCTIONS    General Information:     Your doctor has ordered for us to remove your drain, port, or catheter. This could be that you do not need it anymore, it is not doing its job, your physician has decided on another plan for your treatment and/or it may need replacing. Home Care Instructions: You can resume your regular diet and medication regimen. Do not drink alcohol, drive, or make any important legal decisions in the next 24 hours. Do not lift anything heavier than a gallon of milk, or do anything strenuous for the next 24 hours. The nurse who discharges you to home should review with you any wound care instructions. Resume your normal level of activity slowly. You may shower after 24 hours, but do not take a bath, swim or immerse yourself in water until the site has healed. Call If:     You should call your Physician and/or the Radiology Nurse if you have any bleeding. Call if you have any signs of infection, fever, or increased pain at the site. Follow-Up Instructions: Please see your ordering doctor as he/she has requested. To Reach Us: If you have any questions about your procedure, please call the Interventional Radiology department at [phone]. After business hours (5pm) and weekends, call the answering service at [phone] and ask for the Radiologist on call to be paged.      Interventional Radiology General Nurse Discharge    After general anesthesia or intravenous sedation, for 24 hours or while taking prescription Narcotics:  · Limit your activities  · Do not drive and operate hazardous machinery  · Do not make important personal or business decisions  · Do  not drink alcoholic beverages  · If you have not urinated within 8 hours after discharge, please contact your surgeon on call. * Please give a list of your current medications to your Primary Care Provider. * Please update this list whenever your medications are discontinued, doses are     changed, or new medications (including over-the-counter products) are added. * Please carry medication information at all times in case of emergency situations. These are general instructions for a healthy lifestyle:    No smoking/ No tobacco products/ Avoid exposure to second hand smoke  Surgeon General's Warning:  Quitting smoking now greatly reduces serious risk to your health. Obesity, smoking, and sedentary lifestyle greatly increases your risk for illness  A healthy diet, regular physical exercise & weight monitoring are important for maintaining a healthy lifestyle    You may be retaining fluid if you have a history of heart failure or if you experience any of the following symptoms:  Weight gain of 3 pounds or more overnight or 5 pounds in a week, increased swelling in our hands or feet or shortness of breath while lying flat in bed. Please call your doctor as soon as you notice any of these symptoms; do not wait until your next office visit. Recognize signs and symptoms of STROKE:  F-face looks uneven    A-arms unable to move or move unevenly    S-speech slurred or non-existent    T-time-call 911 as soon as signs and symptoms begin-DO NOT go       Back to bed or wait to see if you get better-TIME IS BRAIN.       Patient Signature:  Date: 12/13/2017  Discharging Nurse: Latonia Jansen RN

## 2017-12-13 NOTE — ANESTHESIA POSTPROCEDURE EVALUATION
Post-Anesthesia Evaluation and Assessment    Patient: Constance Bartholomew MRN: [id number]  SSN: [id number]    YOB: 1945  Age: 71 y.o. Sex: male       Cardiovascular Function/Vital Signs  Visit Vitals    /70    Pulse 74    Temp 36.7 °C (98 °F)    Resp 16    SpO2 95%       Patient is status post No value filed. anesthesia for Procedure(s):  IR  ANESTHESIA/PORT REMOVAL . Nausea/Vomiting: None    Postoperative hydration reviewed and adequate. Pain:  Pain Scale 1: Numeric (0 - 10) (12/13/17 0958)  Pain Intensity 1: 0 (12/13/17 0958)   Managed    Neurological Status:   Neuro (WDL): Within Defined Limits (12/13/17 0958)   At baseline    Mental Status and Level of Consciousness: Arousable    Pulmonary Status:   O2 Device: Room air (12/13/17 0958)   Adequate oxygenation and airway patent    Complications related to anesthesia: None    Post-anesthesia assessment completed.  No concerns    Signed By: Renetta Rene MD     December 13, 2017

## 2021-01-01 ENCOUNTER — ANESTHESIA EVENT (OUTPATIENT)
Dept: SURGERY | Age: 76
End: 2021-01-01
Payer: MEDICARE

## 2021-01-01 ENCOUNTER — HOME CARE VISIT (OUTPATIENT)
Dept: HOSPICE | Facility: HOSPICE | Age: 76
End: 2021-01-01
Payer: MEDICARE

## 2021-01-01 ENCOUNTER — HOSPITAL ENCOUNTER (OUTPATIENT)
Dept: LAB | Age: 76
Discharge: HOME | End: 2021-03-25
Payer: MEDICARE

## 2021-01-01 ENCOUNTER — HOSPITAL ENCOUNTER (OUTPATIENT)
Dept: ULTRASOUND IMAGING | Age: 76
Discharge: HOME | End: 2021-03-05
Attending: INTERNAL MEDICINE
Payer: MEDICARE

## 2021-01-01 ENCOUNTER — HOME CARE VISIT (OUTPATIENT)
Dept: SCHEDULING | Facility: HOME HEALTH | Age: 76
End: 2021-01-01
Payer: MEDICARE

## 2021-01-01 ENCOUNTER — HOSPITAL ENCOUNTER (OUTPATIENT)
Dept: INTERVENTIONAL RADIOLOGY/VASCULAR | Age: 76
Discharge: HOME | End: 2021-03-09
Attending: INTERNAL MEDICINE
Payer: MEDICARE

## 2021-01-01 ENCOUNTER — HOSPITAL ENCOUNTER (INPATIENT)
Age: 76
LOS: 6 days | Discharge: HOME | End: 2021-04-01
Attending: INTERNAL MEDICINE | Admitting: INTERNAL MEDICINE
Payer: MEDICARE

## 2021-01-01 ENCOUNTER — ANESTHESIA (OUTPATIENT)
Dept: SURGERY | Age: 76
End: 2021-01-01
Payer: MEDICARE

## 2021-01-01 ENCOUNTER — HOSPITAL ENCOUNTER (OUTPATIENT)
Dept: INFUSION THERAPY | Age: 76
Discharge: HOME | End: 2021-03-25
Payer: MEDICARE

## 2021-01-01 ENCOUNTER — PATIENT OUTREACH (OUTPATIENT)
Dept: CASE MANAGEMENT | Age: 76
End: 2021-01-01

## 2021-01-01 ENCOUNTER — HOSPITAL ENCOUNTER (OUTPATIENT)
Dept: LAB | Age: 76
Discharge: HOME | End: 2021-02-22
Payer: MEDICARE

## 2021-01-01 ENCOUNTER — HOSPITAL ENCOUNTER (OUTPATIENT)
Dept: INFUSION THERAPY | Age: 76
End: 2021-01-01

## 2021-01-01 ENCOUNTER — HOSPITAL ENCOUNTER (OUTPATIENT)
Age: 76
Setting detail: OUTPATIENT SURGERY
Discharge: HOME | End: 2021-03-09
Attending: RADIOLOGY | Admitting: RADIOLOGY
Payer: MEDICARE

## 2021-01-01 ENCOUNTER — HOSPITAL ENCOUNTER (OUTPATIENT)
Dept: LAB | Age: 76
Discharge: HOME | End: 2021-03-02
Payer: MEDICARE

## 2021-01-01 ENCOUNTER — HOSPITAL ENCOUNTER (OUTPATIENT)
Dept: CT IMAGING | Age: 76
Discharge: HOME | End: 2021-02-23
Attending: INTERNAL MEDICINE

## 2021-01-01 ENCOUNTER — HOSPICE ADMISSION (OUTPATIENT)
Dept: HOSPICE | Facility: HOSPICE | Age: 76
End: 2021-01-01
Payer: MEDICARE

## 2021-01-01 VITALS — RESPIRATION RATE: 16 BRPM | HEART RATE: 80 BPM | DIASTOLIC BLOOD PRESSURE: 80 MMHG | SYSTOLIC BLOOD PRESSURE: 100 MMHG

## 2021-01-01 VITALS
TEMPERATURE: 96.4 F | SYSTOLIC BLOOD PRESSURE: 94 MMHG | RESPIRATION RATE: 20 BRPM | HEART RATE: 96 BPM | DIASTOLIC BLOOD PRESSURE: 47 MMHG

## 2021-01-01 VITALS
OXYGEN SATURATION: 93 % | RESPIRATION RATE: 18 BRPM | SYSTOLIC BLOOD PRESSURE: 106 MMHG | DIASTOLIC BLOOD PRESSURE: 57 MMHG | TEMPERATURE: 97.1 F | HEART RATE: 90 BPM

## 2021-01-01 VITALS
RESPIRATION RATE: 18 BRPM | WEIGHT: 173.4 LBS | DIASTOLIC BLOOD PRESSURE: 46 MMHG | TEMPERATURE: 97.4 F | OXYGEN SATURATION: 99 % | SYSTOLIC BLOOD PRESSURE: 96 MMHG | HEART RATE: 88 BPM | BODY MASS INDEX: 24.18 KG/M2

## 2021-01-01 VITALS
SYSTOLIC BLOOD PRESSURE: 101 MMHG | WEIGHT: 178 LBS | HEART RATE: 81 BPM | TEMPERATURE: 98 F | OXYGEN SATURATION: 93 % | BODY MASS INDEX: 24.92 KG/M2 | HEIGHT: 71 IN | RESPIRATION RATE: 16 BRPM | DIASTOLIC BLOOD PRESSURE: 57 MMHG

## 2021-01-01 VITALS
DIASTOLIC BLOOD PRESSURE: 57 MMHG | SYSTOLIC BLOOD PRESSURE: 112 MMHG | RESPIRATION RATE: 15 BRPM | HEART RATE: 90 BPM | OXYGEN SATURATION: 98 % | TEMPERATURE: 98.1 F

## 2021-01-01 DIAGNOSIS — C44.509 CARCINOMA OF ABDOMINAL WALL: Primary | ICD-10-CM

## 2021-01-01 DIAGNOSIS — C18.9 COLON CARCINOMA (HCC): ICD-10-CM

## 2021-01-01 DIAGNOSIS — C78.7 METASTATIC ADENOCARCINOMA TO LIVER (HCC): ICD-10-CM

## 2021-01-01 DIAGNOSIS — K72.00 ACUTE LIVER FAILURE WITHOUT HEPATIC COMA: ICD-10-CM

## 2021-01-01 DIAGNOSIS — K83.1 JAUNDICE, OBSTRUCTIVE, INTRAHEPATIC: ICD-10-CM

## 2021-01-01 DIAGNOSIS — R16.0 LIVER MASS: ICD-10-CM

## 2021-01-01 DIAGNOSIS — Z72.0 TOBACCO ABUSE: ICD-10-CM

## 2021-01-01 DIAGNOSIS — C78.00 MALIGNANT NEOPLASM METASTATIC TO LUNG, UNSPECIFIED LATERALITY (HCC): ICD-10-CM

## 2021-01-01 DIAGNOSIS — G93.41 ENCEPHALOPATHY, METABOLIC: ICD-10-CM

## 2021-01-01 DIAGNOSIS — J96.01 ACUTE RESPIRATORY FAILURE WITH HYPOXIA (HCC): ICD-10-CM

## 2021-01-01 DIAGNOSIS — E72.20 HYPERAMMONEMIA (HCC): ICD-10-CM

## 2021-01-01 DIAGNOSIS — Z85.89 H/O KNOWN METASTASIS TO LIVER: ICD-10-CM

## 2021-01-01 DIAGNOSIS — R11.2 NON-INTRACTABLE VOMITING WITH NAUSEA: ICD-10-CM

## 2021-01-01 DIAGNOSIS — Z01.812 PRE-PROCEDURE LAB EXAM: ICD-10-CM

## 2021-01-01 DIAGNOSIS — E88.09 EDEMA DUE TO HYPOALBUMINEMIA: ICD-10-CM

## 2021-01-01 DIAGNOSIS — G89.3 CANCER ASSOCIATED PAIN: ICD-10-CM

## 2021-01-01 LAB
ALBUMIN SERPL-MCNC: 1.5 G/DL (ref 3.2–4.6)
ALBUMIN SERPL-MCNC: 2.6 G/DL (ref 3.2–4.6)
ALBUMIN SERPL-MCNC: 3.1 G/DL (ref 3.2–4.6)
ALBUMIN/GLOB SERPL: 0.3 {RATIO} (ref 1.2–3.5)
ALBUMIN/GLOB SERPL: 0.5 {RATIO} (ref 1.2–3.5)
ALBUMIN/GLOB SERPL: 0.6 {RATIO} (ref 1.2–3.5)
ALP SERPL-CCNC: 445 U/L (ref 50–136)
ALP SERPL-CCNC: 479 U/L (ref 50–136)
ALP SERPL-CCNC: 762 U/L (ref 50–136)
ALT SERPL-CCNC: 158 U/L (ref 12–65)
ALT SERPL-CCNC: 166 U/L (ref 12–65)
ALT SERPL-CCNC: 320 U/L (ref 12–65)
AMMONIA PLAS-SCNC: 74 UMOL/L (ref 11–32)
ANION GAP SERPL CALC-SCNC: 7 MMOL/L (ref 7–16)
ANION GAP SERPL CALC-SCNC: 8 MMOL/L (ref 7–16)
ANION GAP SERPL CALC-SCNC: 8 MMOL/L (ref 7–16)
APTT PPP: 33.7 SEC (ref 24.1–35.1)
AST SERPL-CCNC: 169 U/L (ref 15–37)
AST SERPL-CCNC: 172 U/L (ref 15–37)
AST SERPL-CCNC: 325 U/L (ref 15–37)
BASOPHILS # BLD: 0.1 K/UL (ref 0–0.2)
BASOPHILS NFR BLD: 1 % (ref 0–2)
BILIRUB SERPL-MCNC: 10.2 MG/DL (ref 0.2–1.1)
BILIRUB SERPL-MCNC: 21.4 MG/DL (ref 0.2–1.1)
BILIRUB SERPL-MCNC: 25.5 MG/DL (ref 0.2–1.1)
BUN SERPL-MCNC: 17 MG/DL (ref 8–23)
BUN SERPL-MCNC: 18 MG/DL (ref 8–23)
BUN SERPL-MCNC: 35 MG/DL (ref 8–23)
CALCIUM SERPL-MCNC: 8.7 MG/DL (ref 8.3–10.4)
CALCIUM SERPL-MCNC: 9.5 MG/DL (ref 8.3–10.4)
CALCIUM SERPL-MCNC: 9.5 MG/DL (ref 8.3–10.4)
CEA SERPL-MCNC: 2372.4 NG/ML (ref 0–3)
CEA SERPL-MCNC: 3636.6 NG/ML (ref 0–3)
CHLORIDE SERPL-SCNC: 96 MMOL/L (ref 98–107)
CHLORIDE SERPL-SCNC: 98 MMOL/L (ref 98–107)
CHLORIDE SERPL-SCNC: 99 MMOL/L (ref 98–107)
CO2 SERPL-SCNC: 29 MMOL/L (ref 21–32)
CREAT SERPL-MCNC: 1.2 MG/DL (ref 0.8–1.5)
CREAT SERPL-MCNC: 1.4 MG/DL (ref 0.8–1.5)
CREAT SERPL-MCNC: 1.5 MG/DL (ref 0.8–1.5)
DIFFERENTIAL METHOD BLD: ABNORMAL
EOSINOPHIL # BLD: 0.1 K/UL (ref 0–0.8)
EOSINOPHIL # BLD: 0.1 K/UL (ref 0–0.8)
EOSINOPHIL # BLD: 0.2 K/UL (ref 0–0.8)
EOSINOPHIL NFR BLD: 1 % (ref 0.5–7.8)
EOSINOPHIL NFR BLD: 1 % (ref 0.5–7.8)
EOSINOPHIL NFR BLD: 2 % (ref 0.5–7.8)
ERYTHROCYTE [DISTWIDTH] IN BLOOD BY AUTOMATED COUNT: 15.5 % (ref 11.9–14.6)
ERYTHROCYTE [DISTWIDTH] IN BLOOD BY AUTOMATED COUNT: 18.9 % (ref 11.9–14.6)
ERYTHROCYTE [DISTWIDTH] IN BLOOD BY AUTOMATED COUNT: 20.3 % (ref 11.9–14.6)
GLOBULIN SER CALC-MCNC: 4.9 G/DL (ref 2.3–3.5)
GLOBULIN SER CALC-MCNC: 4.9 G/DL (ref 2.3–3.5)
GLOBULIN SER CALC-MCNC: 5.1 G/DL (ref 2.3–3.5)
GLUCOSE SERPL-MCNC: 104 MG/DL (ref 65–100)
GLUCOSE SERPL-MCNC: 108 MG/DL (ref 65–100)
GLUCOSE SERPL-MCNC: 114 MG/DL (ref 65–100)
HCT VFR BLD AUTO: 38.7 % (ref 41.1–50.3)
HCT VFR BLD AUTO: 38.8 %
HCT VFR BLD AUTO: 43.8 % (ref 41.1–50.3)
HGB BLD-MCNC: 13 G/DL (ref 13.6–17.2)
HGB BLD-MCNC: 13.3 G/DL (ref 13.6–17.2)
HGB BLD-MCNC: 14.1 G/DL (ref 13.6–17.2)
IMM GRANULOCYTES # BLD AUTO: 0 K/UL (ref 0–0.5)
IMM GRANULOCYTES # BLD AUTO: 0.1 K/UL (ref 0–0.5)
IMM GRANULOCYTES # BLD AUTO: 0.2 K/UL (ref 0–0.5)
IMM GRANULOCYTES NFR BLD AUTO: 1 % (ref 0–5)
IMM GRANULOCYTES NFR BLD AUTO: 1 % (ref 0–5)
IMM GRANULOCYTES NFR BLD AUTO: 2 % (ref 0–5)
INR PPP: 1.1
LYMPHOCYTES # BLD: 0.5 K/UL (ref 0.5–4.6)
LYMPHOCYTES # BLD: 0.6 K/UL (ref 0.5–4.6)
LYMPHOCYTES # BLD: 0.7 K/UL (ref 0.5–4.6)
LYMPHOCYTES NFR BLD: 5 % (ref 13–44)
LYMPHOCYTES NFR BLD: 8 % (ref 13–44)
LYMPHOCYTES NFR BLD: 8 % (ref 13–44)
MAGNESIUM SERPL-MCNC: 2.6 MG/DL (ref 1.8–2.4)
MCH RBC QN AUTO: 30.5 PG (ref 26.1–32.9)
MCH RBC QN AUTO: 30.7 PG (ref 26.1–32.9)
MCH RBC QN AUTO: 32 PG (ref 26.1–32.9)
MCHC RBC AUTO-ENTMCNC: 32.2 G/DL (ref 31.4–35)
MCHC RBC AUTO-ENTMCNC: 33.6 G/DL (ref 31.4–35)
MCHC RBC AUTO-ENTMCNC: 34.3 G/DL (ref 31.4–35)
MCV RBC AUTO: 91.5 FL (ref 79.6–97.8)
MCV RBC AUTO: 93.3 FL (ref 79.6–97.8)
MCV RBC AUTO: 94.8 FL (ref 79.6–97.8)
MONOCYTES # BLD: 0.7 K/UL (ref 0.1–1.3)
MONOCYTES # BLD: 0.8 K/UL (ref 0.1–1.3)
MONOCYTES # BLD: 0.9 K/UL (ref 0.1–1.3)
MONOCYTES NFR BLD: 10 % (ref 4–12)
MONOCYTES NFR BLD: 10 % (ref 4–12)
MONOCYTES NFR BLD: 7 % (ref 4–12)
NEUTS SEG # BLD: 10.6 K/UL (ref 1.7–8.2)
NEUTS SEG # BLD: 5.1 K/UL (ref 1.7–8.2)
NEUTS SEG # BLD: 6.6 K/UL (ref 1.7–8.2)
NEUTS SEG NFR BLD: 79 % (ref 43–78)
NEUTS SEG NFR BLD: 79 % (ref 43–78)
NEUTS SEG NFR BLD: 84 % (ref 43–78)
NRBC # BLD: 0 K/UL (ref 0–0.2)
PLATELET # BLD AUTO: 270 K/UL (ref 150–450)
PLATELET # BLD AUTO: 311 K/UL (ref 150–450)
PLATELET # BLD AUTO: 342 K/UL (ref 150–450)
PMV BLD AUTO: 10 FL (ref 9.4–12.3)
PMV BLD AUTO: 10.2 FL (ref 9.4–12.3)
PMV BLD AUTO: 9.9 FL (ref 9.4–12.3)
POTASSIUM SERPL-SCNC: 2.8 MMOL/L (ref 3.5–5.1)
POTASSIUM SERPL-SCNC: 4.2 MMOL/L (ref 3.5–5.1)
POTASSIUM SERPL-SCNC: 4.2 MMOL/L (ref 3.5–5.1)
PROT SERPL-MCNC: 6.4 G/DL (ref 6.3–8.2)
PROT SERPL-MCNC: 7.5 G/DL (ref 6.3–8.2)
PROT SERPL-MCNC: 8.2 G/DL (ref 6.3–8.2)
PROTHROMBIN TIME: 14 SEC (ref 12.5–14.7)
RBC # BLD AUTO: 4.16 M/UL (ref 4.23–5.6)
RBC # BLD AUTO: 4.23 M/UL (ref 4.23–5.67)
RBC # BLD AUTO: 4.62 M/UL (ref 4.23–5.67)
SODIUM SERPL-SCNC: 133 MMOL/L (ref 136–145)
SODIUM SERPL-SCNC: 135 MMOL/L (ref 136–145)
SODIUM SERPL-SCNC: 135 MMOL/L (ref 136–145)
WBC # BLD AUTO: 12.7 K/UL (ref 4.3–11.1)
WBC # BLD AUTO: 6.5 K/UL (ref 4.3–11.1)
WBC # BLD AUTO: 8.3 K/UL (ref 4.3–11.1)

## 2021-01-01 PROCEDURE — 74011000250 HC RX REV CODE- 250: Performed by: RADIOLOGY

## 2021-01-01 PROCEDURE — 96365 THER/PROPH/DIAG IV INF INIT: CPT

## 2021-01-01 PROCEDURE — 82378 CARCINOEMBRYONIC ANTIGEN: CPT

## 2021-01-01 PROCEDURE — 74011250637 HC RX REV CODE- 250/637: Performed by: INTERNAL MEDICINE

## 2021-01-01 PROCEDURE — 0656 HSPC GENERAL INPATIENT

## 2021-01-01 PROCEDURE — 76705 ECHO EXAM OF ABDOMEN: CPT

## 2021-01-01 PROCEDURE — 36415 COLL VENOUS BLD VENIPUNCTURE: CPT

## 2021-01-01 PROCEDURE — 83735 ASSAY OF MAGNESIUM: CPT

## 2021-01-01 PROCEDURE — G0299 HHS/HOSPICE OF RN EA 15 MIN: HCPCS

## 2021-01-01 PROCEDURE — 77030036720 HC NDL CORE BIOP MISSION DISP BARD -B

## 2021-01-01 PROCEDURE — 74011250636 HC RX REV CODE- 250/636: Performed by: NURSE ANESTHETIST, CERTIFIED REGISTERED

## 2021-01-01 PROCEDURE — 0655 HSPC INPATIENT RESPITE

## 2021-01-01 PROCEDURE — 88307 TISSUE EXAM BY PATHOLOGIST: CPT

## 2021-01-01 PROCEDURE — 77030031131 HC SUT MXN P COVD -B

## 2021-01-01 PROCEDURE — 82140 ASSAY OF AMMONIA: CPT

## 2021-01-01 PROCEDURE — 99152 MOD SED SAME PHYS/QHP 5/>YRS: CPT

## 2021-01-01 PROCEDURE — 85730 THROMBOPLASTIN TIME PARTIAL: CPT

## 2021-01-01 PROCEDURE — 96361 HYDRATE IV INFUSION ADD-ON: CPT

## 2021-01-01 PROCEDURE — 77030010507 HC ADH SKN DERMBND J&J -B

## 2021-01-01 PROCEDURE — 85025 COMPLETE CBC W/AUTO DIFF WBC: CPT

## 2021-01-01 PROCEDURE — 0651 HSPC ROUTINE HOME CARE

## 2021-01-01 PROCEDURE — 74011250636 HC RX REV CODE- 250/636: Performed by: NURSE PRACTITIONER

## 2021-01-01 PROCEDURE — 77001 FLUOROGUIDE FOR VEIN DEVICE: CPT

## 2021-01-01 PROCEDURE — 80053 COMPREHEN METABOLIC PANEL: CPT

## 2021-01-01 PROCEDURE — 96360 HYDRATION IV INFUSION INIT: CPT

## 2021-01-01 PROCEDURE — 3336500001 HSPC ELECTION

## 2021-01-01 PROCEDURE — 74011000250 HC RX REV CODE- 250: Performed by: INTERNAL MEDICINE

## 2021-01-01 PROCEDURE — 74011250636 HC RX REV CODE- 250/636: Performed by: INTERNAL MEDICINE

## 2021-01-01 PROCEDURE — C1894 INTRO/SHEATH, NON-LASER: HCPCS

## 2021-01-01 PROCEDURE — 74011250636 HC RX REV CODE- 250/636: Performed by: PHYSICIAN ASSISTANT

## 2021-01-01 PROCEDURE — 74011250636 HC RX REV CODE- 250/636: Performed by: RADIOLOGY

## 2021-01-01 PROCEDURE — 74011000250 HC RX REV CODE- 250: Performed by: PHYSICIAN ASSISTANT

## 2021-01-01 PROCEDURE — 74011250637 HC RX REV CODE- 250/637: Performed by: NURSE PRACTITIONER

## 2021-01-01 PROCEDURE — 77030031139 HC SUT VCRL2 J&J -A

## 2021-01-01 PROCEDURE — 96366 THER/PROPH/DIAG IV INF ADDON: CPT

## 2021-01-01 PROCEDURE — 85610 PROTHROMBIN TIME: CPT

## 2021-01-01 PROCEDURE — C1788 PORT, INDWELLING, IMP: HCPCS

## 2021-01-01 PROCEDURE — 3331090004 HSPC SERVICE INTENSITY ADD-ON

## 2021-01-01 PROCEDURE — 76942 ECHO GUIDE FOR BIOPSY: CPT

## 2021-01-01 PROCEDURE — 76060000032 HC ANESTHESIA 0.5 TO 1 HR: Performed by: RADIOLOGY

## 2021-01-01 PROCEDURE — 74011000250 HC RX REV CODE- 250: Performed by: NURSE ANESTHETIST, CERTIFIED REGISTERED

## 2021-01-01 PROCEDURE — 74011000250 HC RX REV CODE- 250: Performed by: NURSE PRACTITIONER

## 2021-01-01 RX ORDER — LORAZEPAM 2 MG/ML
1 INJECTION INTRAMUSCULAR
Status: DISCONTINUED | OUTPATIENT
Start: 2021-01-01 | End: 2021-01-01

## 2021-01-01 RX ORDER — PROCHLORPERAZINE MALEATE 10 MG
10 TABLET ORAL
Status: DISCONTINUED | OUTPATIENT
Start: 2021-01-01 | End: 2021-01-01 | Stop reason: HOSPADM

## 2021-01-01 RX ORDER — SODIUM CHLORIDE 0.9 % (FLUSH) 0.9 %
10 SYRINGE (ML) INJECTION
Status: DISCONTINUED | OUTPATIENT
Start: 2021-01-01 | End: 2021-01-01 | Stop reason: HOSPADM

## 2021-01-01 RX ORDER — SODIUM CHLORIDE 9 MG/ML
1000 INJECTION, SOLUTION INTRAVENOUS CONTINUOUS
Status: DISCONTINUED | OUTPATIENT
Start: 2021-01-01 | End: 2021-01-01 | Stop reason: HOSPADM

## 2021-01-01 RX ORDER — DIPHENHYDRAMINE HYDROCHLORIDE 50 MG/ML
25 INJECTION, SOLUTION INTRAMUSCULAR; INTRAVENOUS
Status: DISCONTINUED | OUTPATIENT
Start: 2021-01-01 | End: 2021-01-01 | Stop reason: HOSPADM

## 2021-01-01 RX ORDER — HEPARIN 100 UNIT/ML
300 SYRINGE INTRAVENOUS AS NEEDED
Status: DISCONTINUED | OUTPATIENT
Start: 2021-01-01 | End: 2021-01-01 | Stop reason: HOSPADM

## 2021-01-01 RX ORDER — SODIUM CHLORIDE 0.9 % (FLUSH) 0.9 %
10 SYRINGE (ML) INJECTION AS NEEDED
Status: DISCONTINUED | OUTPATIENT
Start: 2021-01-01 | End: 2021-01-01 | Stop reason: HOSPADM

## 2021-01-01 RX ORDER — LORAZEPAM 2 MG/ML
1 INJECTION INTRAMUSCULAR
Status: DISCONTINUED | OUTPATIENT
Start: 2021-01-01 | End: 2021-01-01 | Stop reason: HOSPADM

## 2021-01-01 RX ORDER — FENTANYL CITRATE 50 UG/ML
25-100 INJECTION, SOLUTION INTRAMUSCULAR; INTRAVENOUS
Status: DISCONTINUED | OUTPATIENT
Start: 2021-01-01 | End: 2021-01-01 | Stop reason: HOSPADM

## 2021-01-01 RX ORDER — GLYCOPYRROLATE 0.2 MG/ML
0.2 INJECTION INTRAMUSCULAR; INTRAVENOUS
Status: DISCONTINUED | OUTPATIENT
Start: 2021-01-01 | End: 2021-01-01 | Stop reason: HOSPADM

## 2021-01-01 RX ORDER — LIDOCAINE HYDROCHLORIDE 20 MG/ML
2-20 INJECTION, SOLUTION INFILTRATION; PERINEURAL
Status: DISCONTINUED | OUTPATIENT
Start: 2021-01-01 | End: 2021-01-01 | Stop reason: HOSPADM

## 2021-01-01 RX ORDER — SODIUM CHLORIDE, SODIUM LACTATE, POTASSIUM CHLORIDE, CALCIUM CHLORIDE 600; 310; 30; 20 MG/100ML; MG/100ML; MG/100ML; MG/100ML
1000 INJECTION, SOLUTION INTRAVENOUS CONTINUOUS
Status: CANCELLED | OUTPATIENT
Start: 2021-01-01 | End: 2021-01-01

## 2021-01-01 RX ORDER — CEFAZOLIN SODIUM/WATER 2 G/20 ML
SYRINGE (ML) INTRAVENOUS AS NEEDED
Status: DISCONTINUED | OUTPATIENT
Start: 2021-01-01 | End: 2021-01-01 | Stop reason: HOSPADM

## 2021-01-01 RX ORDER — MIDAZOLAM HYDROCHLORIDE 1 MG/ML
.5-2 INJECTION, SOLUTION INTRAMUSCULAR; INTRAVENOUS
Status: DISCONTINUED | OUTPATIENT
Start: 2021-01-01 | End: 2021-01-01 | Stop reason: HOSPADM

## 2021-01-01 RX ORDER — DIPHENHYDRAMINE HYDROCHLORIDE 50 MG/ML
12.5 INJECTION, SOLUTION INTRAMUSCULAR; INTRAVENOUS ONCE
Status: CANCELLED | OUTPATIENT
Start: 2021-01-01 | End: 2021-01-01

## 2021-01-01 RX ORDER — IPRATROPIUM BROMIDE AND ALBUTEROL SULFATE 2.5; .5 MG/3ML; MG/3ML
3 SOLUTION RESPIRATORY (INHALATION)
Status: DISCONTINUED | OUTPATIENT
Start: 2021-01-01 | End: 2021-01-01 | Stop reason: HOSPADM

## 2021-01-01 RX ORDER — OXYCODONE HYDROCHLORIDE 5 MG/1
10 TABLET ORAL
Status: CANCELLED | OUTPATIENT
Start: 2021-01-01 | End: 2021-01-01

## 2021-01-01 RX ORDER — OXYCODONE HYDROCHLORIDE 5 MG/1
5 TABLET ORAL
Status: CANCELLED | OUTPATIENT
Start: 2021-01-01 | End: 2021-01-01

## 2021-01-01 RX ORDER — NALOXONE HYDROCHLORIDE 0.4 MG/ML
0.1 INJECTION, SOLUTION INTRAMUSCULAR; INTRAVENOUS; SUBCUTANEOUS AS NEEDED
Status: CANCELLED | OUTPATIENT
Start: 2021-01-01 | End: 2021-01-01

## 2021-01-01 RX ORDER — ONDANSETRON 2 MG/ML
4 INJECTION INTRAMUSCULAR; INTRAVENOUS ONCE
Status: CANCELLED | OUTPATIENT
Start: 2021-01-01 | End: 2021-01-01

## 2021-01-01 RX ORDER — MORPHINE SULFATE 2 MG/ML
2 INJECTION, SOLUTION INTRAMUSCULAR; INTRAVENOUS
Status: DISCONTINUED | OUTPATIENT
Start: 2021-01-01 | End: 2021-01-01 | Stop reason: HOSPADM

## 2021-01-01 RX ORDER — SODIUM CHLORIDE, SODIUM LACTATE, POTASSIUM CHLORIDE, CALCIUM CHLORIDE 600; 310; 30; 20 MG/100ML; MG/100ML; MG/100ML; MG/100ML
INJECTION, SOLUTION INTRAVENOUS
Status: DISCONTINUED | OUTPATIENT
Start: 2021-01-01 | End: 2021-01-01 | Stop reason: HOSPADM

## 2021-01-01 RX ORDER — HYDROMORPHONE HYDROCHLORIDE 2 MG/1
2 TABLET ORAL
Status: DISCONTINUED | OUTPATIENT
Start: 2021-01-01 | End: 2021-01-01 | Stop reason: HOSPADM

## 2021-01-01 RX ORDER — SODIUM CHLORIDE 0.9 % (FLUSH) 0.9 %
10 SYRINGE (ML) INJECTION EVERY 12 HOURS
Status: DISCONTINUED | OUTPATIENT
Start: 2021-01-01 | End: 2021-01-01 | Stop reason: HOSPADM

## 2021-01-01 RX ORDER — LIDOCAINE HYDROCHLORIDE AND EPINEPHRINE 20; 10 MG/ML; UG/ML
20-200 INJECTION, SOLUTION INFILTRATION; PERINEURAL ONCE
Status: COMPLETED | OUTPATIENT
Start: 2021-01-01 | End: 2021-01-01

## 2021-01-01 RX ORDER — PROPOFOL 10 MG/ML
INJECTION, EMULSION INTRAVENOUS
Status: DISCONTINUED | OUTPATIENT
Start: 2021-01-01 | End: 2021-01-01 | Stop reason: HOSPADM

## 2021-01-01 RX ORDER — SODIUM CHLORIDE 0.9 % (FLUSH) 0.9 %
10 SYRINGE (ML) INJECTION
Status: COMPLETED | OUTPATIENT
Start: 2021-01-01 | End: 2021-01-01

## 2021-01-01 RX ORDER — LIDOCAINE AND PRILOCAINE 25; 25 MG/G; MG/G
CREAM TOPICAL AS NEEDED
Status: DISCONTINUED | OUTPATIENT
Start: 2021-01-01 | End: 2021-01-01 | Stop reason: HOSPADM

## 2021-01-01 RX ORDER — FENTANYL CITRATE 50 UG/ML
100 INJECTION, SOLUTION INTRAMUSCULAR; INTRAVENOUS AS NEEDED
Status: CANCELLED | OUTPATIENT
Start: 2021-01-01

## 2021-01-01 RX ORDER — PROPOFOL 10 MG/ML
INJECTION, EMULSION INTRAVENOUS AS NEEDED
Status: DISCONTINUED | OUTPATIENT
Start: 2021-01-01 | End: 2021-01-01 | Stop reason: HOSPADM

## 2021-01-01 RX ORDER — HALOPERIDOL 2 MG/ML
4 SOLUTION ORAL
Status: DISCONTINUED | OUTPATIENT
Start: 2021-01-01 | End: 2021-01-01 | Stop reason: HOSPADM

## 2021-01-01 RX ORDER — HEPARIN SODIUM (PORCINE) LOCK FLUSH IV SOLN 100 UNIT/ML 100 UNIT/ML
500 SOLUTION INTRAVENOUS ONCE
Status: COMPLETED | OUTPATIENT
Start: 2021-01-01 | End: 2021-01-01

## 2021-01-01 RX ORDER — HYDROMORPHONE HYDROCHLORIDE 2 MG/1
2 TABLET ORAL
Qty: 112 TAB | Refills: 0 | Status: SHIPPED | OUTPATIENT
Start: 2021-01-01 | End: 2021-04-14

## 2021-01-01 RX ORDER — POTASSIUM CHLORIDE AND SODIUM CHLORIDE 900; 300 MG/100ML; MG/100ML
INJECTION, SOLUTION INTRAVENOUS CONTINUOUS
Status: DISCONTINUED | OUTPATIENT
Start: 2021-01-01 | End: 2021-01-01 | Stop reason: CLARIF

## 2021-01-01 RX ORDER — HEPARIN 100 UNIT/ML
500 SYRINGE INTRAVENOUS
Status: DISCONTINUED | OUTPATIENT
Start: 2021-01-01 | End: 2021-01-01 | Stop reason: HOSPADM

## 2021-01-01 RX ORDER — ONDANSETRON 2 MG/ML
INJECTION INTRAMUSCULAR; INTRAVENOUS AS NEEDED
Status: DISCONTINUED | OUTPATIENT
Start: 2021-01-01 | End: 2021-01-01 | Stop reason: HOSPADM

## 2021-01-01 RX ORDER — SODIUM CHLORIDE 9 MG/ML
25 INJECTION, SOLUTION INTRAVENOUS ONCE
Status: ACTIVE | OUTPATIENT
Start: 2021-01-01 | End: 2021-01-01

## 2021-01-01 RX ORDER — HALOPERIDOL 5 MG/ML
2 INJECTION INTRAMUSCULAR
Status: DISCONTINUED | OUTPATIENT
Start: 2021-01-01 | End: 2021-01-01 | Stop reason: HOSPADM

## 2021-01-01 RX ORDER — SODIUM CHLORIDE 0.9 % (FLUSH) 0.9 %
10 SYRINGE (ML) INJECTION
Qty: 1 SYRINGE | Refills: 0 | Status: SHIPPED | OUTPATIENT
Start: 2021-01-01

## 2021-01-01 RX ORDER — HEPARIN 100 UNIT/ML
500 SYRINGE INTRAVENOUS
Qty: 1 ML | Refills: 0 | Status: SHIPPED | OUTPATIENT
Start: 2021-01-01

## 2021-01-01 RX ORDER — FACIAL-BODY WIPES
10 EACH TOPICAL AS NEEDED
Status: DISCONTINUED | OUTPATIENT
Start: 2021-01-01 | End: 2021-01-01 | Stop reason: HOSPADM

## 2021-01-01 RX ORDER — HEPARIN 100 UNIT/ML
300 SYRINGE INTRAVENOUS EVERY 12 HOURS
Status: DISCONTINUED | OUTPATIENT
Start: 2021-01-01 | End: 2021-01-01 | Stop reason: HOSPADM

## 2021-01-01 RX ORDER — LIDOCAINE HYDROCHLORIDE 10 MG/ML
0.1 INJECTION INFILTRATION; PERINEURAL AS NEEDED
Status: CANCELLED | OUTPATIENT
Start: 2021-01-01

## 2021-01-01 RX ORDER — DIPHENHYDRAMINE HYDROCHLORIDE 50 MG/ML
INJECTION, SOLUTION INTRAMUSCULAR; INTRAVENOUS AS NEEDED
Status: DISCONTINUED | OUTPATIENT
Start: 2021-01-01 | End: 2021-01-01 | Stop reason: HOSPADM

## 2021-01-01 RX ORDER — SODIUM CHLORIDE, SODIUM LACTATE, POTASSIUM CHLORIDE, CALCIUM CHLORIDE 600; 310; 30; 20 MG/100ML; MG/100ML; MG/100ML; MG/100ML
75 INJECTION, SOLUTION INTRAVENOUS CONTINUOUS
Status: CANCELLED | OUTPATIENT
Start: 2021-01-01

## 2021-01-01 RX ORDER — ACETAMINOPHEN 325 MG/1
650 TABLET ORAL
Status: DISCONTINUED | OUTPATIENT
Start: 2021-01-01 | End: 2021-01-01 | Stop reason: HOSPADM

## 2021-01-01 RX ORDER — HYDROMORPHONE HYDROCHLORIDE 1 MG/ML
0.5 INJECTION, SOLUTION INTRAMUSCULAR; INTRAVENOUS; SUBCUTANEOUS
Status: CANCELLED | OUTPATIENT
Start: 2021-01-01

## 2021-01-01 RX ORDER — SENNOSIDES 8.6 MG/1
1 TABLET ORAL 2 TIMES DAILY
Qty: 28 TAB | Refills: 0 | Status: SHIPPED | OUTPATIENT
Start: 2021-01-01

## 2021-01-01 RX ORDER — GLYCOPYRROLATE 0.2 MG/ML
0.2 INJECTION INTRAMUSCULAR; INTRAVENOUS
Status: DISCONTINUED | OUTPATIENT
Start: 2021-01-01 | End: 2021-01-01

## 2021-01-01 RX ORDER — MIDAZOLAM HYDROCHLORIDE 1 MG/ML
2 INJECTION, SOLUTION INTRAMUSCULAR; INTRAVENOUS
Status: CANCELLED | OUTPATIENT
Start: 2021-01-01 | End: 2021-01-01

## 2021-01-01 RX ORDER — SENNOSIDES 8.6 MG/1
1 TABLET ORAL 2 TIMES DAILY
Status: DISCONTINUED | OUTPATIENT
Start: 2021-01-01 | End: 2021-01-01 | Stop reason: HOSPADM

## 2021-01-01 RX ORDER — HALOPERIDOL 2 MG/ML
4 SOLUTION ORAL
Qty: 30 ML | Refills: 0 | Status: SHIPPED | OUTPATIENT
Start: 2021-01-01

## 2021-01-01 RX ADMIN — Medication 10 ML: at 14:29

## 2021-01-01 RX ADMIN — PROPOFOL 20 MG: 10 INJECTION, EMULSION INTRAVENOUS at 11:29

## 2021-01-01 RX ADMIN — PROPOFOL 100 MCG/KG/MIN: 10 INJECTION, EMULSION INTRAVENOUS at 11:29

## 2021-01-01 RX ADMIN — HALOPERIDOL LACTATE 2 MG: 5 INJECTION, SOLUTION INTRAMUSCULAR at 21:59

## 2021-01-01 RX ADMIN — PROCHLORPERAZINE MALEATE 10 MG: 10 TABLET ORAL at 18:04

## 2021-01-01 RX ADMIN — Medication 10 ML: at 13:23

## 2021-01-01 RX ADMIN — Medication 300 UNITS: at 21:58

## 2021-01-01 RX ADMIN — ONDANSETRON 4 MG: 2 INJECTION INTRAMUSCULAR; INTRAVENOUS at 11:22

## 2021-01-01 RX ADMIN — SENNOSIDES 8.6 MG: 8.6 TABLET, FILM COATED ORAL at 17:04

## 2021-01-01 RX ADMIN — SODIUM CHLORIDE, PRESERVATIVE FREE 10 ML: 5 INJECTION INTRAVENOUS at 22:00

## 2021-01-01 RX ADMIN — SENNOSIDES 8.6 MG: 8.6 TABLET, FILM COATED ORAL at 10:27

## 2021-01-01 RX ADMIN — MORPHINE SULFATE 2 MG: 2 INJECTION, SOLUTION INTRAMUSCULAR; INTRAVENOUS at 17:19

## 2021-01-01 RX ADMIN — FENTANYL CITRATE 50 MCG: 50 INJECTION, SOLUTION INTRAMUSCULAR; INTRAVENOUS at 10:14

## 2021-01-01 RX ADMIN — SENNOSIDES 8.6 MG: 8.6 TABLET, FILM COATED ORAL at 18:00

## 2021-01-01 RX ADMIN — CEFAZOLIN 2 G: 1 INJECTION, POWDER, FOR SOLUTION INTRAVENOUS at 11:29

## 2021-01-01 RX ADMIN — SENNOSIDES 8.6 MG: 8.6 TABLET, FILM COATED ORAL at 18:04

## 2021-01-01 RX ADMIN — MORPHINE SULFATE 2 MG: 2 INJECTION, SOLUTION INTRAMUSCULAR; INTRAVENOUS at 21:57

## 2021-01-01 RX ADMIN — SENNOSIDES 8.6 MG: 8.6 TABLET, FILM COATED ORAL at 17:09

## 2021-01-01 RX ADMIN — HALOPERIDOL 4 MG: 2 SOLUTION ORAL at 11:55

## 2021-01-01 RX ADMIN — SODIUM CHLORIDE, PRESERVATIVE FREE 10 ML: 5 INJECTION INTRAVENOUS at 09:45

## 2021-01-01 RX ADMIN — HYDROMORPHONE HYDROCHLORIDE 2 MG: 2 TABLET ORAL at 11:55

## 2021-01-01 RX ADMIN — HEPARIN SODIUM (PORCINE) LOCK FLUSH IV SOLN 100 UNIT/ML 500 UNITS: 100 SOLUTION at 11:53

## 2021-01-01 RX ADMIN — SENNOSIDES 8.6 MG: 8.6 TABLET, FILM COATED ORAL at 09:31

## 2021-01-01 RX ADMIN — DIPHENHYDRAMINE HYDROCHLORIDE 6.25 MG: 50 INJECTION, SOLUTION INTRAMUSCULAR; INTRAVENOUS at 11:22

## 2021-01-01 RX ADMIN — HALOPERIDOL LACTATE 2 MG: 5 INJECTION, SOLUTION INTRAMUSCULAR at 17:18

## 2021-01-01 RX ADMIN — POTASSIUM CHLORIDE: 2 INJECTION, SOLUTION, CONCENTRATE INTRAVENOUS at 14:38

## 2021-01-01 RX ADMIN — SENNOSIDES 8.6 MG: 8.6 TABLET, FILM COATED ORAL at 11:49

## 2021-01-01 RX ADMIN — MIDAZOLAM 1 MG: 1 INJECTION INTRAMUSCULAR; INTRAVENOUS at 10:14

## 2021-01-01 RX ADMIN — PHENYLEPHRINE HYDROCHLORIDE 50 MCG: 10 INJECTION INTRAVENOUS at 11:55

## 2021-01-01 RX ADMIN — LIDOCAINE HYDROCHLORIDE 5 ML: 20 INJECTION, SOLUTION INFILTRATION; PERINEURAL at 10:29

## 2021-01-01 RX ADMIN — SENNOSIDES 8.6 MG: 8.6 TABLET, FILM COATED ORAL at 11:20

## 2021-01-01 RX ADMIN — Medication 500 UNITS: at 09:26

## 2021-01-01 RX ADMIN — Medication 10 ML: at 18:40

## 2021-01-01 RX ADMIN — SENNOSIDES 8.6 MG: 8.6 TABLET, FILM COATED ORAL at 09:26

## 2021-01-01 RX ADMIN — SODIUM CHLORIDE, PRESERVATIVE FREE 10 ML: 5 INJECTION INTRAVENOUS at 09:27

## 2021-01-01 RX ADMIN — SENNOSIDES 8.6 MG: 8.6 TABLET, FILM COATED ORAL at 09:44

## 2021-01-01 RX ADMIN — LIDOCAINE HYDROCHLORIDE AND EPINEPHRINE 160 MG: 20; 10 INJECTION, SOLUTION INFILTRATION; PERINEURAL at 11:52

## 2021-01-01 RX ADMIN — SODIUM CHLORIDE, SODIUM LACTATE, POTASSIUM CHLORIDE, AND CALCIUM CHLORIDE: 600; 310; 30; 20 INJECTION, SOLUTION INTRAVENOUS at 11:22

## 2021-01-01 RX ADMIN — Medication 300 UNITS: at 09:45

## 2021-01-01 RX ADMIN — SODIUM CHLORIDE, PRESERVATIVE FREE 10 ML: 5 INJECTION INTRAVENOUS at 17:19

## 2021-03-05 NOTE — PROCEDURES
Department of Interventional Radiology  [phone]        Interventional Radiology Brief Procedure Note    Patient: Osvaldo Khalil MRN: [id number]  SSN: [id number]    YOB: 1945  Age: 77 y.o.   Sex: male      Date of Procedure: 3/5/2021    Pre-Procedure Diagnosis: liver mass    Post-Procedure Diagnosis: SAME    Procedure(s): Image Guided Biopsy    Brief Description of Procedure: as above    Performed By: Emily Reza MD     Assistants: None    Anesthesia:Moderate Sedation    Estimated Blood Loss: Less than 10ml    Specimens:  Pathology    Implants:  None    Findings: let lobe lesion    Complications: None    Recommendations: routine post care     Follow Up: as needed    Signed By: Emily Reza MD     March 5, 2021

## 2021-03-05 NOTE — PROGRESS NOTES
TRANSFER - OUT REPORT:    Verbal report given to alaina(name) on Andrés Deras  being transferred to IR recovery(unit) for routine progression of care       Report consisted of patients Situation, Background, Assessment and   Recommendations(SBAR). Information from the following report(s) SBAR, Procedure Summary and MAR was reviewed with the receiving nurse. Opportunity for questions and clarification was provided. Conscious Sedation:   50 Mcg of Fentanyl administered  1 Mg of Versed administered    Pt tolerated procedure well.      Visit Vitals  /66   Pulse 80   Temp 98 °F (36.7 °C)   Resp 14   Ht 5' 11\" (1.803 m)   Wt 80.7 kg (178 lb)   SpO2 99%   BMI 24.83 kg/m²     Past Medical History:   Diagnosis Date    Anxiety and depression     manage well with Klonopin    Cancer ([facility] [place] [address]     tumor removed from colon    History of staph infection 2013    to R hand- treated    Long term (current) use of anticoagulants     Eliquis    Prostate hypertrophy     Pulmonary embolism (HCC)     hx of- started on Eliquis    Smoker     1 pk/day x since age 26    Unspecified disorder of intestine      Peripheral IV 03/05/21 Left Antecubital (Active)   Site Assessment Clean, dry, & intact 03/05/21 0906   Phlebitis Assessment 0 03/05/21 0906   Infiltration Assessment 0 03/05/21 0906   Hub Color/Line Status Pink 03/05/21 0906

## 2021-03-05 NOTE — DISCHARGE INSTRUCTIONS
[address] 35 [address]  Department of Interventional Radiology  [facility]  [phone] Office  [phone] Fax    BIOPSY DISCHARGE INSTRUCTIONS    General Instructions:     A biopsy is the removal of a small piece of tissue for microscopic examination or testing. Healthy tissue can be obtained for the purpose of tissue-type matching for transplants. Unhealthy tissues are more commonly biopsied to diagnose disease. Lung Biopsy:     A needle lung biopsy is performed when there is a mass discovered in the lung area. The most serious risk is infection, bleeding, and pneumothorax (a collapsed lung). Signs of pneumothorax include shortness of breath, rapid heart rate, and blueness of the skin. If any of these occur, call 911. Liver Biopsy: This test helps detect cancer, infections, and the cause of an enlargement of the liver or elevated liver enzymes. It also helps to diagnose a number of liver diseases. The pain associated with the procedure may be felt in the shoulder. The risks include internal bleeding, pneumothorax, and injury to the surrounding organs. Renal Biopsy: This procedure is sometimes done to evaluate a transplanted kidney. It is also used to evaluate unexplained decrease in kidney function, blood, or protein in the urine. You may see bright red blood in the urine the first 24 hours after the test. If the bleeding lasts longer, you need to call your doctor. There is a risk of infection and bleeding into the muscle, which may cause soreness. Spinal Biopsy: This test is sometimes done in conjunction with other procedures. Your back will be sore, as we are taking a small sample of bone, which is slightly more difficult to sample than tissue. General Biopsy:     A mass can grow in any area of the body, and we are taking a specimen as ordered by your doctor. The risks are the same.  They include bleeding, pain, and infection. Home Care Instructions: You may resume your regular diet and medication regimen. Do not drink alcohol, drive, or make any important legal decisions in the next 24 hours. Do not lift anything heavier than a gallon of milk until the soreness goes away. You may use over the counter acetaminophen or ibuprofen for the soreness. You may apply an ice pack to the affected area for 20-30 minutes at time for the first 24 hours. After that, you may apply a heat pack. Call If: You should call your Physician and/or the Radiology Nurse if you have any questions or concerns about the biopsy site. Call if you should have increased pain, fever, redness, drainage, or bleeding more than a small spot on the bandage. Follow-Up Instructions: Please see your ordering doctor as he/she has requested. To Reach Us: If you have any questions about your procedure, please call the Interventional Radiology department at [phone]. After business hours (5pm) and weekends, call the answering service at [phone] and ask for the Radiologist on call to be paged. Si tiene Preguntas acerca del procedimiento, por favor llame al departamento de Radiología Intervencional al [phone]. Después de horas de oficina (5 pm) y los fines de [place], llamar al [place] al [phone] y pregunte por el Radiologo de [place] [facility]. Interventional Radiology General Nurse Discharge    After general anesthesia or intravenous sedation, for 24 hours or while taking prescription Narcotics:  · Limit your activities  · Do not drive and operate hazardous machinery  · Do not make important personal or business decisions  · Do  not drink alcoholic beverages  · If you have not urinated within 8 hours after discharge, please contact your surgeon on call. * Please give a list of your current medications to your Primary Care Provider.   * Please update this list whenever your medications are discontinued, doses are changed, or new medications (including over-the-counter products) are added. * Please carry medication information at all times in case of emergency situations. These are general instructions for a healthy lifestyle:    No smoking/ No tobacco products/ Avoid exposure to second hand smoke  Surgeon General's Warning:  Quitting smoking now greatly reduces serious risk to your health. Obesity, smoking, and sedentary lifestyle greatly increases your risk for illness  A healthy diet, regular physical exercise & weight monitoring are important for maintaining a healthy lifestyle    You may be retaining fluid if you have a history of heart failure or if you experience any of the following symptoms:  Weight gain of 3 pounds or more overnight or 5 pounds in a week, increased swelling in our hands or feet or shortness of breath while lying flat in bed. Please call your doctor as soon as you notice any of these symptoms; do not wait until your next office visit. Recognize signs and symptoms of STROKE:  F-face looks uneven    A-arms unable to move or move unevenly    S-speech slurred or non-existent    T-time-call 911 as soon as signs and symptoms begin-DO NOT go       Back to bed or wait to see if you get better-TIME IS BRAIN.       Patient Signature:  Date: 3/5/2021  Discharging Nurse: Casa Acosta

## 2021-03-05 NOTE — H&P
History and Physical    Patient: Osvaldo Khalil MRN: [id number]  SSN: [id number]    YOB: 1945  Age: 77 y.o. Sex: male      Subjective:      Osvaldo Khalil is a 77 y.o. male who has jaundice and liver lesions. History of colon CA.  NPO. Past Medical History:   Diagnosis Date    Anxiety and depression     manage well with Klonopin    Cancer ([address]     tumor removed from colon    History of staph infection     to R hand- treated    Long term (current) use of anticoagulants     Eliquis    Prostate hypertrophy     Pulmonary embolism (HCC)     hx of- started on Eliquis    Smoker     1 pk/day x since age 26    Unspecified disorder of intestine      Past Surgical History:   Procedure Laterality Date    HX TONSIL AND ADENOIDECTOMY  at age 4   [de-identified] TUMOR REMOVAL      removed from colon      Family History   Problem Relation Age of Onset    Cancer Mother         Pancreatic and Liver    No Known Problems Father     Heart Disease Brother     Heart Attack Brother     No Known Problems Brother      Social History     Tobacco Use    Smoking status: Former Smoker     Packs/day: 2.00     Years: 50.00     Pack years: 100.00     Types: Cigarettes     Quit date: 2016     Years since quittin.2    Smokeless tobacco: Never Used   Substance Use Topics    Alcohol use: No      Prior to Admission medications    Medication Sig Start Date End Date Taking? Authorizing Provider   ondansetron (ZOFRAN ODT) 4 mg disintegrating tablet ondansetron 4 mg disintegrating tablet   1-2 tablet every 6 hours as needed. Provider, Historical        No Known Allergies    Review of Systems:  Pertinent items are noted in the History of Present Illness.     Objective:     Vitals:    21 0903   BP: 105/60   Pulse: 97   Resp: 18   Temp: 98 °F (36.7 °C)   SpO2: 97%   Weight: 80.7 kg (178 lb)   Height: 5' 11\" (1.803 m)        Physical Exam:  LUNG: clear to auscultation bilaterally  HEART: regular rate and rhythm    Assessment:     Hospital Problems  Date Reviewed: 3/2/2021    None          Plan:     Image guided liver lesion biopsy. Moderate sedation.     Signed By: Sangeeta Newton MD     March 5, 2021

## 2021-03-09 NOTE — H&P
Department of Interventional Radiology  [phone]    History and Physical    Patient:  Sivan Walter MRN:  [id number]  SSN:  [id number]    YOB: 1945  Age:  77 y.o. Sex:  male      Primary Care Provider:  Nicolette Hillman MD  Referring Physician:  Bridget Littlejohn MD    Subjective:     Chief Complaint: port    History of the Present Illness: The patient is a 77 y.o. male with metastatic colon cancer who presents for venous chest port placement. NPO. Past Medical History:   Diagnosis Date    Abnormal liver function tests     Anxiety and depression     manage well with Klonopin    Cancer ([facility] [address]     tumor removed from colon    History of staph infection 2013    to R hand- treated    Long term (current) use of anticoagulants     Eliquis- wife states no longer on Eliquis and now only takes 81 mg (phone assessment 3/8/21)    Prostate hypertrophy     Pulmonary embolism ([facility] [address]     history of PE ~2017-- per wife no longer takes Eliquis- states takes 81 mg ASA    Smoker     1 pk/day x since age 26    Unspecified disorder of intestine      Past Surgical History:   Procedure Laterality Date    HX TONSIL AND ADENOIDECTOMY  at age 4   [place] [facility] TUMOR REMOVAL      removed from colon    IR [address]  2017    port removed        Review of Systems:    Pertinent items are noted in the History of Present Illness. Prior to Admission medications    Medication Sig Start Date End Date Taking? Authorizing Provider   aspirin 81 mg chewable tablet Take 81 mg by mouth daily. Patient's wife states he takes 81 mg aspirin daily   Yes Provider, Historical   ondansetron (ZOFRAN ODT) 4 mg disintegrating tablet ondansetron 4 mg disintegrating tablet   1-2 tablet every 6 hours as needed.    Yes Provider, Historical        No Known Allergies    Family History   Problem Relation Age of Onset    Cancer Mother         Pancreatic and Liver    No Known Problems Father     Heart Disease Brother     Heart Attack Brother     No Known Problems Brother      Social History     Tobacco Use    Smoking status: Former Smoker     Packs/day: 2.00     Years: 50.00     Pack years: 100.00     Types: Cigarettes     Quit date: 2016     Years since quittin.2    Smokeless tobacco: Never Used   Substance Use Topics    Alcohol use: No        Objective:       Physical Examination:    Vitals:    21 1041   BP: 132/74   Pulse: (!) 103   Resp: 18   Temp: 97.1 °F (36.2 °C)   SpO2: 96%       Pain Assessment  Pain Intensity 1: 7 (21 1041)               HEART: regular rate and rhythm  LUNG: clear to auscultation bilaterally  ABDOMEN: normal findings: soft, non-tender  EXTREMITIES: warm, no edema    Laboratory:     Lab Results   Component Value Date/Time    Sodium 135 (L) 2021 01:04 PM    Sodium 135 (L) 2021 01:06 PM    Potassium 4.2 2021 01:04 PM    Potassium 4.2 2021 01:06 PM    Chloride 99 2021 01:04 PM    Chloride 98 2021 01:06 PM    CO2 29 2021 01:04 PM    CO2 29 2021 01:06 PM    Anion gap 7 2021 01:04 PM    Anion gap 8 2021 01:06 PM    Glucose 104 (H) 2021 01:04 PM    Glucose 108 (H) 2021 01:06 PM    BUN 17 2021 01:04 PM    BUN 18 2021 01:06 PM    Creatinine 1.20 2021 01:04 PM    Creatinine 1.40 2021 01:06 PM    GFR est AA >60 2021 01:04 PM    GFR est AA >60 2021 01:06 PM    GFR est non-AA >60 2021 01:04 PM    GFR est non-AA 53 (L) 2021 01:06 PM    Calcium 9.5 2021 01:04 PM    Calcium 9.5 2021 01:06 PM    Magnesium 2.1 2017 12:39 PM    Magnesium 1.8 2017 02:25 PM    Albumin 2.6 (L) 2021 01:04 PM    Albumin 3.1 (L) 2021 01:06 PM    Protein, total 7.5 2021 01:04 PM    Protein, total 8.2 2021 01:06 PM    Globulin 4.9 (H) 2021 01:04 PM    Globulin 5.1 (H) 2021 01:06 PM    A-G Ratio 0.5 (L) 2021 01:04 PM    A-G Ratio 0.6 (L) 02/22/2021 01:06 PM    ALT (SGPT) 158 (H) 03/02/2021 01:04 PM    ALT (SGPT) 320 (H) 02/22/2021 01:06 PM     Lab Results   Component Value Date/Time    WBC 8.3 03/02/2021 01:04 PM    WBC 6.5 02/22/2021 01:06 PM    HGB 13.0 (L) 03/02/2021 01:04 PM    HGB 14.1 02/22/2021 01:06 PM    HCT 38.7 (L) 03/02/2021 01:04 PM    HCT 43.8 02/22/2021 01:06 PM    PLATELET 648 62/63/9778 01:04 PM    PLATELET 926 71/11/9635 01:06 PM     Lab Results   Component Value Date/Time    aPTT 33.7 03/02/2021 02:29 PM    aPTT 28.5 03/15/2017 10:23 AM    Prothrombin time 14.0 03/02/2021 02:29 PM    Prothrombin time 11.2 03/15/2017 10:23 AM    INR 1.1 03/02/2021 02:29 PM    INR 1.1 03/15/2017 10:23 AM       Assessment:     Metastatic colon cancer    Hospital Problems  Date Reviewed: 3/2/2021    None          Plan:     Planned Procedure:  Port placement    Risks, benefits, and alternatives reviewed with patient and he agrees to proceed with the procedure.       Signed By: Liberty Joy PA-C     March 9, 2021

## 2021-03-09 NOTE — ANESTHESIA POSTPROCEDURE EVALUATION
Procedure(s): 
IR ANESTHESIA/ PORT PLACEMENT. total IV anesthesia Anesthesia Post Evaluation Multimodal analgesia: multimodal analgesia used between 6 hours prior to anesthesia start to PACU discharge Patient location during evaluation: bedside Patient participation: complete - patient participated Level of consciousness: responsive to verbal stimuli Pain management: adequate Airway patency: patent Anesthetic complications: no 
Cardiovascular status: hemodynamically stable Respiratory status: spontaneous ventilation Hydration status: stable Final Post Anesthesia Temperature Assessment:  Normothermia (36.0-37.5 degrees C) INITIAL Post-op Vital signs:  
Vitals Value Taken Time /57 03/09/21 1232 Temp Pulse 90 03/09/21 1232 Resp 18 03/09/21 1232 SpO2 93 % 03/09/21 1232

## 2021-03-09 NOTE — ANESTHESIA PREPROCEDURE EVALUATION
Anesthetic History No history of anesthetic complications Review of Systems / Medical History Patient summary reviewed and pertinent labs reviewed Pulmonary Smoker Neuro/Psych Within defined limits Cardiovascular Exercise tolerance: >4 METS Comments: H/o of multiple PE's GI/Hepatic/Renal 
Within defined limits Endo/Other Other Findings Comments: H/o colon CA s/p chemo Liver Mass and Lung Nodules suspect Mets Given his longterm tobacco use I presume he has some degree of undiagnosed copd. Physical Exam 
 
Airway Mallampati: III 
TM Distance: 4 - 6 cm Neck ROM: normal range of motion Mouth opening: Normal 
 
 Cardiovascular Rhythm: regular Rate: normal 
 
 
Pertinent negatives: No murmur, JVD and peripheral edema Dental 
 
 
  
Pulmonary Breath sounds clear to auscultation Abdominal 
 
 
 
 Other Findings Anesthetic Plan ASA: 3 Anesthesia type: total IV anesthesia Induction: Intravenous Anesthetic plan and risks discussed with: Patient 
 
 
NPO >24 hours. Notes nausea this morning but empty stomach 
 
Jaundice

## 2021-03-09 NOTE — DISCHARGE INSTRUCTIONS
[address]  Department of Interventional Radiology  [facility]  [phone] Office  [phone] Fax  Implanted Port Discharge Instructions      General Instructions:   A port is like an implanted IV. They are usually ordered for patients who will be getting chemotherapy, but can also be used as an IV for long term antibiotics, large amounts of fluids, and/or blood products. Your blood can be drawn from your port for labs also. Those patients who do not have good veins find the ports convenient as they can get the IV they need with one stick. The port can be used long term, and the care is easy. The device is under the skin, and once the skin heals, care is minimal. All that is required is the nurse who accesses the port will need to flush it with heparinized saline after each use. Ports are usually placed in the chest wall, usually on the right side. But they can be place in the arms and in the abdomen. Home Care Instructions: If your port is in your arm, do not allow blood pressure or other IVs to be place in that arm. Do not allow bra straps or any clothing to rub the skin over the port. Do not bathe or swim until the skin has healed and if the port is accessed. Once it is healed, and when the port is not accessed, it is okay to bathe and swim. Restrict yourself to light activity for the first 5 days after getting the port put in, after that, resume normal activity slowly. You may resume your normal diet and medications. Follow-Up Instructions: Please see your oncologist, or whatever physician ordered the port as he/she has requested of you. Call If: You should call your Physician and/or the Radiology Nurse if you notice redness, pus, swelling, or pain from the area of your incision. Call if you should develop a fever. The nurses who access your port will know to call your doctor if the port does not seem to be working properly. You need to tell the nurses who use the port if you should have any pain or swelling at the site during an infusion. To Reach Us: If you have any questions about your procedure, please call the Interventional Radiology department at [phone]. After business hours (5pm) and weekends, call the answering service at [phone] and ask for the Radiologist on call to be paged. Si tiene Preguntas acerca del procedimiento, por favor llame al departamento de Radiología Intervencional al [phone]. Después de horas de oficina (5 pm) y los fines de [place], llamar al [place] al [phone] y pregunte por el Radiologo de [place] Jarad Soregi. Interventional Radiology General Nurse Discharge    After general anesthesia or intravenous sedation, for 24 hours or while taking prescription Narcotics:  · Limit your activities  · Do not drive and operate hazardous machinery  · Do not make important personal or business decisions  · Do  not drink alcoholic beverages  · If you have not urinated within 8 hours after discharge, please contact your surgeon on call. * Please give a list of your current medications to your Primary Care Provider. * Please update this list whenever your medications are discontinued, doses are     changed, or new medications (including over-the-counter products) are added. * Please carry medication information at all times in case of emergency situations. These are general instructions for a healthy lifestyle:    No smoking/ No tobacco products/ Avoid exposure to second hand smoke  Surgeon General's Warning:  Quitting smoking now greatly reduces serious risk to your health.     Obesity, smoking, and sedentary lifestyle greatly increases your risk for illness  A healthy diet, regular physical exercise & weight monitoring are important for maintaining a healthy lifestyle    You may be retaining fluid if you have a history of heart failure or if you experience any of the following symptoms:  Weight gain of 3 pounds or more overnight or 5 pounds in a week, increased swelling in our hands or feet or shortness of breath while lying flat in bed. Please call your doctor as soon as you notice any of these symptoms; do not wait until your next office visit. Recognize signs and symptoms of STROKE:  F-face looks uneven    A-arms unable to move or move unevenly    S-speech slurred or non-existent    T-time-call 911 as soon as signs and symptoms begin-DO NOT go       Back to bed or wait to see if you get better-TIME IS BRAIN.       Patient Signature:  Date: 3/9/2021  Discharging Nurse: Hamlet Catherine

## 2021-03-09 NOTE — PROCEDURES
Department of Interventional Radiology  [phone]        Interventional Radiology Brief Procedure Note    Patient: Jenaro Vera MRN: [id number]  SSN: [id number]    YOB: 1945  Age: 77 y.o.   Sex: male      Date of Procedure: 3/9/2021    Pre-Procedure Diagnosis: metastatic colon cancer    Post-Procedure Diagnosis: SAME    Procedure(s): Venous Chest Port Placement    Brief Description of Procedure: US, fluoro guided right IJ venous chest port placed    Performed By: Lalo Sol PA-C     Assistants: None    Anesthesia:TIVS/MAC    Estimated Blood Loss: Less than 10ml    Specimens:  None    Implants:  Chest Port Placement    Findings: catheter tip in right atrium     Complications: None    Recommendations: ok to use port     Follow Up: prn    Signed By: Lalo Sol PA-C     March 9, 2021

## 2021-03-09 NOTE — PROGRESS NOTES
[address] March 9, 2021       RE: Hilda Piña      To Whom It May Concern,    This is to certify that Guaman HuyHilda 's son needs to be excused from work on 3/10 and 3/11 to care for him after his procedure. Please feel free to contact my office if you have any questions or concerns. Thank you for your assistance in this matter.       Sincerely,  Ez Luz RN

## 2021-03-11 NOTE — PROGRESS NOTES
Late Entry Spiritual Care visit. Initial Visit, Pre Surgery Consult. Visit and prayer before patient goes to surgery. Visit by Marizol Sargent M.Ed., .B. ,Staff

## 2021-03-26 PROBLEM — E88.09 EDEMA DUE TO HYPOALBUMINEMIA: Status: ACTIVE | Noted: 2021-01-01

## 2021-03-26 PROBLEM — G93.41 ENCEPHALOPATHY, METABOLIC: Status: ACTIVE | Noted: 2021-01-01

## 2021-03-26 PROBLEM — C78.7 ADENOCARCINOMA OF COLON METASTATIC TO LIVER (HCC): Status: ACTIVE | Noted: 2021-01-01

## 2021-03-26 PROBLEM — C34.90 LUNG CANCER, PRIMARY, WITH METASTASIS FROM LUNG TO OTHER SITE, UNSPECIFIED LATERALITY (HCC): Status: RESOLVED | Noted: 2021-01-01 | Resolved: 2021-01-01

## 2021-03-26 PROBLEM — G89.3 CANCER ASSOCIATED PAIN: Status: ACTIVE | Noted: 2021-01-01

## 2021-03-26 PROBLEM — K72.00 ACUTE LIVER FAILURE WITHOUT HEPATIC COMA: Status: ACTIVE | Noted: 2021-01-01

## 2021-03-26 PROBLEM — C78.00 LUNG METASTASIS (HCC): Status: ACTIVE | Noted: 2021-01-01

## 2021-03-26 PROBLEM — E72.20 HYPERAMMONEMIA (HCC): Status: ACTIVE | Noted: 2021-01-01

## 2021-03-26 PROBLEM — J96.91 RESPIRATORY FAILURE WITH HYPOXIA (HCC): Status: ACTIVE | Noted: 2021-01-01

## 2021-03-26 PROBLEM — R11.2 NON-INTRACTABLE VOMITING WITH NAUSEA: Status: ACTIVE | Noted: 2021-01-01

## 2021-03-26 PROBLEM — K83.1 JAUNDICE, OBSTRUCTIVE, INTRAHEPATIC: Status: ACTIVE | Noted: 2021-01-01

## 2021-03-26 PROBLEM — C78.7 METASTATIC ADENOCARCINOMA TO LIVER (HCC): Status: ACTIVE | Noted: 2021-01-01

## 2021-03-26 PROBLEM — C18.9 ADENOCARCINOMA OF COLON METASTATIC TO LIVER (HCC): Status: ACTIVE | Noted: 2021-01-01

## 2021-03-26 PROBLEM — C34.90 LUNG CANCER, PRIMARY, WITH METASTASIS FROM LUNG TO OTHER SITE, UNSPECIFIED LATERALITY (HCC): Status: ACTIVE | Noted: 2021-01-01

## 2021-03-26 NOTE — [facility] IDG NURSE NOTES
Patient: Eric Up Date: 03/26/21 Time: 1300 900 [address] [place] [address] Nurse Notes Pt admitted to [facility] from pt's home for GIP level of care. Pt has a hospice dx of metastatic colon cancer. Pt is alert and oriented x 3. Denies pain on admission. Pt states the ambulance ride was bumpy, but denies pain on transfer. Pt states \"I would be a lot better if they didn't bring me here. \"  Pt's skin is extremely jaundiced. BLE are edematous +1. Abdomen is distended, but soft. Pt had a BM this morning. Pt has a port that is not accessed. RR is regular and even and breath sounds are clear. Denies needs at this time. Pt settled in bed with bed low and SR up and call bell within reach. Family is not present with pt. Admission complete and initial general hospice care plan initiated which includes spiritual, psychosocial and bereavement. No immediate needs at this time. Will continue to monitor pt for symptom management. IDG team, including MD, made aware of plan of care and immediate needs. No volunteer services at this time due to Covid-19.   
 
 
Signed by: Reyna Love RN

## 2021-03-26 NOTE — PROGRESS NOTES
Pt admitted to [facility] from pt's home for GIP level of care. Pt has a hospice dx of metastatic colon cancer. Pt is alert and oriented x 3. Denies pain on admission. Pt states the ambulance ride was bumpy, but denies pain on transfer. Pt states \"I would be a lot better if they didn't bring me here. \"  Pt's skin is extremely jaundiced. BLE are edematous +1. Pt settled in bed with bed low and SR up and call bell within reach. 1400 Pt resting with eyes closed. No s/s of distress. Pt not awakened. Port not accessed at this time due to pt sleeping. 1530 Pt continuing to sleep. Oxygen placed on at 2l/min per order. Pt states \"I just want to sleep. \" Denies pain or nausea. 66 Daughter in law and granddaughter here. Pt continuing to sleep. FLACC 0 and RR is regular and even. 19 Son is here and requests to see the . Stella Oliveira MSW notified. Pt up to BR with assist to void. Pt ate broth and jello for supper.   
 
Report given to oncoming RN

## 2021-03-27 NOTE — PROGRESS NOTES
Problem: Falls - Risk of 
Goal: *Absence of Falls Description: Document Edilma Noguera Fall Risk and appropriate interventions in the flowsheet. Outcome: Progressing Towards Goal 
Note: Fall Risk Interventions: 
  
 
  
 
Medication Interventions: Bed/chair exit alarm, Patient to call before getting OOB Elimination Interventions: Bed/chair exit alarm, Call light in reach Problem: Pressure Injury - Risk of 
Goal: *Prevention of pressure injury Description: Document Siddhartha Scale and appropriate interventions in the flowsheet. Outcome: Progressing Towards Goal 
Note: Pressure Injury Interventions: 
Sensory Interventions: Assess changes in LOC, Float heels, Keep linens dry and wrinkle-free, Minimize linen layers, Pressure redistribution bed/mattress (bed type) Activity Interventions: Assess need for specialty bed, Pressure redistribution bed/mattress(bed type) Mobility Interventions: Float heels, HOB 30 degrees or less, Pressure redistribution bed/mattress (bed type) Nutrition Interventions: Document food/fluid/supplement intake Friction and Shear Interventions: Apply protective barrier, creams and emollients, HOB 30 degrees or less, Minimize layers

## 2021-03-27 NOTE — PROGRESS NOTES
1900 Walking rounds completed with Erick Mcguire RN. Pt idientified by name and . Pt is under GIP care with a hospice diagnosis of metastatic colon cancer. Pt is alert and oriented; able to voice needs and concerns. Pt is ambulatory with assistance. Continent of b/b. Requires minimal assistance with ADLs. Pt resting quietly in bed with eyes closed. Pt denies pain. RR even and non labored. No signs of NVD or SOB. Bed in lowest and locked position with siderails x2; call light in place and tab alert in place. FLACC 0/10.  
 
203 Pt resting quietly in bed with eyes closed; no signs of pain or distress noted. RR non labored on room air. No signs of agitation, NVD, or SOB. FLACC 0/10.  
 
2211 Pt resting comfortably in bed with eyes closed; no signs of pain or distress noted. RR non labored on room air. No signs of agitation, NVD, or SOB. FLACC 0/10.  
 
0022 Pt requested orange juice and water during patient rounds. Pt resting quietly in bed watching TV. Pt denies pain. RR non labored on room air. No signs of agitation, NVD, or SOB. FLACC 0/10.  
 
0200  Pt resting quietly in bed watching TV. Pt denies pain. RR non labored on room air. No signs of agitation, NVD, or SOB. FLACC 0/10.  
 
0411 Pt resting comfortably in bed with eyes closed; no signs of pain or distress noted. RR non labored on room air. No signs of agitation, NVD, or SOB. FLACC 0/10. 0600 Pt resting quietly in bed with eyes closed. Pt denies pain. RR non labored on room air. No signs of agitation, NVD, or SOB. FLACC 0/10. Report given to Erick Mcguire RN.

## 2021-03-27 NOTE — PROGRESS NOTES
Report received. Pt round. Pt identified by name. In bed with eyes closed. No s/s of pain, agitation or dyspnea. Bed low and SR up with tab alerts on.  
 
0830 Pt continuing to sleep. Not awakened for breakfast or AM meds. Pt is not wearing oxygen at this time. 1015 Pt sleeping with RR regular and even. FLACC 0. No s/s of pain, agitation or dyspnea at this time. 0911 34 [id number] 33 Awakened for lunch. Pt states all he wants is \"jello\". Informed that he has broth, jello, and juice. Pt states \"oh, that sounds good! \" Senna given PO. Port not accessed at this time, so flushes held. Pt denies pain, nausea or other needs. 1300 Pt was up to BR with assist from CNA. Bath given by [employer]. Pt denies pain/nausea or other needs at this time. 1423 RR regular and even. FLACC 0. No s/s of pain, agitation or dyspnea at this time. 26 Family visiting. No needs at this time. 1650 Pt set up for supper. States \"this broth is good. \" Denies pain or nausea. 1830 Pt sleeping. No s/s of distress.   
 
Report given to oncoming RN

## 2021-03-28 NOTE — PROGRESS NOTES
Problem: Falls - Risk of 
Goal: *Absence of Falls Description: Document Elli Conn Fall Risk and appropriate interventions in the flowsheet. Outcome: Progressing Towards Goal 
Note: Fall Risk Interventions: 
  
 
  
 
Medication Interventions: Bed/chair exit alarm, Patient to call before getting OOB Elimination Interventions: Bed/chair exit alarm, Call light in reach Problem: Pressure Injury - Risk of 
Goal: *Prevention of pressure injury Description: Document Siddhartha Scale and appropriate interventions in the flowsheet. Outcome: Progressing Towards Goal 
Note: Pressure Injury Interventions: 
Sensory Interventions: Assess changes in LOC, Float heels, Keep linens dry and wrinkle-free, Minimize linen layers, Pressure redistribution bed/mattress (bed type) Activity Interventions: Assess need for specialty bed, Pressure redistribution bed/mattress(bed type) Mobility Interventions: Float heels, HOB 30 degrees or less, Pressure redistribution bed/mattress (bed type) Nutrition Interventions: Document food/fluid/supplement intake Friction and Shear Interventions: Apply protective barrier, creams and emollients, HOB 30 degrees or less, Minimize layers

## 2021-03-28 NOTE — PROGRESS NOTES
Problem: Falls - Risk of 
Goal: *Absence of Falls Description: Document Iveth العراقيchrissy Fall Risk and appropriate interventions in the flowsheet. Outcome: Progressing Towards Goal 
Note: Fall Risk Interventions: 
  
 
  
 
Medication Interventions: Bed/chair exit alarm, Patient to call before getting OOB Elimination Interventions: Bed/chair exit alarm, Call light in reach Problem: Pressure Injury - Risk of 
Goal: *Prevention of pressure injury Description: Document Siddhartha Scale and appropriate interventions in the flowsheet. Outcome: Progressing Towards Goal 
Note: Pressure Injury Interventions: 
Sensory Interventions: Assess changes in LOC, Float heels, Keep linens dry and wrinkle-free, Minimize linen layers, Pressure redistribution bed/mattress (bed type) Activity Interventions: Assess need for specialty bed, Pressure redistribution bed/mattress(bed type) Mobility Interventions: Float heels, HOB 30 degrees or less, Pressure redistribution bed/mattress (bed type) Nutrition Interventions: Document food/fluid/supplement intake Friction and Shear Interventions: Apply protective barrier, creams and emollients, HOB 30 degrees or less, Minimize layers

## 2021-03-28 NOTE — PROGRESS NOTES
Report received. Walking rounds made. Pt identified by name. Pt is respite level of care with hospice dx of metastatic colon cancer. In bed with eyes closed. No s/s of pain, agitation or dyspnea. Bed low and SR up with tab alerts on.  
 
0844 Pt continuing to sleep. Not awakened for meds or breakfast at this time. 1045 Pt awake and eating breakfast. Denies pain, nausea, or shortness of breath. Pt states \"when can I go home? \" Assured that when he is able, he will go home. 1120 Scheduled senna given PO. Pt up to BR with assist of CNA for shower. Back to bed and states \"I'm weak\" when asked if he is hurting at all, pt states \"no\". Oxygen placed back on pt at 2l/min 1215 Pt in bed eating lunch of broth and jello. Denies needs at this time. 1400 Pt sleeping. 36 Wife and son in to visit pt. Update given to son. 1737 Scheduled senna given PO. Pt is eating supper. Family has left. Pt states \"no\" when asked if he is hurting or nauseous. States \"I may just go to sleep. \" Bed low and SR up and call bell within reach.  
 
Report given to oncoming RN

## 2021-03-28 NOTE — PROGRESS NOTES
190 Walking rounds completed with Jay Johnson RN. Pt idientified by name and . Pt is under GIP care with a hospice diagnosis of metastatic colon cancer. Pt is alert and oriented; able to voice needs and concerns. Pt is ambulatory with assistance. Continent of b/b. Requires minimal assistance with ADLs. Pt resting quietly in bed with eyes closed. Pt denies pain. RR even and non labored. No signs of NVD or SOB. Bed in lowest and locked position with siderails x2; call light in place and tab alert in place. FLACC 0/10.  
  
 Pt resting quietly in bed with eyes closed; no signs of pain or distress noted. RR non labored on room air. No signs of agitation, NVD, or SOB. FLACC 0/10.  
  
2216 Pt resting comfortably in bed with eyes closed; no signs of pain or distress noted. RR non labored on room air. No signs of agitation, NVD, or SOB. FLACC 010.  
  
2342 Family at bedside. Pt resting quietly in bed watching TV. Pt denies pain. RR non labored on room air. No signs of agitation, NVD, or SOB. FLACC 0/10.  
  
0109 Pt resting quietly in bed watching TV. Pt denies pain. RR non labored on room air. No signs of agitation, NVD, or SOB. FLACC 0/10.  
  
0311 Pt resting comfortably in bed with eyes closed; no signs of pain or distress noted. RR non labored on room air. No signs of agitation, NVD, or SOB. FLACC 0/10.  
 
0536  Pt resting comfortably in bed with eyes closed; no signs of pain or distress noted. RR non labored on room air. No signs of agitation, NVD, or SOB. FLACC 0/10. 0600 Pt resting quietly in bed with eyes closed. No signs of pain or distress noted. RR non labored on room air. No signs of agitation, NVD, or SOB. FLACC 0/10. Walking rounds completed with Jay Johnson RN.

## 2021-03-29 PROBLEM — C18.0 ADENOCARCINOMA OF CECUM (HCC): Status: ACTIVE | Noted: 2021-01-01

## 2021-03-29 NOTE — [facility] IDG MASTER NOTE
[address] [facility] Date: 03/29/21 Time: 1:30 PM 
 
___________________ Patient: Valerie Beasley Coverage Information: 
   Payor: [place] Plan: [place] MED[place]ARE PART A AND B Subscriber ID: [id number] Phone Number: MRN: [id number] Current Benefit Period: Benefit Period 1 Start Date: 3/26/2021 End Date: 6/23/2021 Hospice Attending Provider: Darshan Arreaga MD 
[address] [place] [address]  [address] Phone: [phone] Fax: [phone] Level of Care: Inpatient Respite Care 
 
 
___________________ Diagnoses: The primary encounter diagnosis was Colon Carcinoma invasion into ad involving RLQ abdominal wall. Diagnoses of Tobacco abuse, H/O known metastasis to liver, Malignant neoplasm metastatic to lung, unspecified laterality ([facility] [address] Acute liver failure without hepatic coma, Non-intractable vomiting with nausea, Jaundice, obstructive, intrahepatic, Cancer associated pain, Metastatic adenocarcinoma to liver [facility]), Acute respiratory failure with hypoxia ([facility] [address] Colon carcinoma ([facility] [address] Encephalopathy, metabolic, Liver mass, Hyperammonemia ([facility] [address] and Edema due to hypoalbuminemia were also pertinent to this visit.  
 
Current Medications: 
 
Current Facility-Administered Medications:  
  lidocaine-prilocaine (EMLA) 2.5-2.5 % cream (Patient Supplied), , Topical, PRN, Darshan Arreaga MD 
  sodium chloride (NS) flush 10 mL, 10 mL, InterCATHeter, Q12H, Darshan Arreaga MD, Stopped at 03/26/21 2100 
  heparin (porcine) pf 300 Units, 300 Units, InterCATHeter, Q12H, Darshan Arreaga MD, Stopped at 03/26/21 2100 
  sodium chloride (NS) flush 10 mL, 10 mL, InterCATHeter, PRN, Darshan Arreaga MD 
  heparin (porcine) pf 300 Units, 300 Units, InterCATHeter, PRN, Darshan Arreaga MD 
  morphine injection 2 mg, 2 mg, SubCUTAneous, Q20MIN PRN **OR** morphine injection 2 mg, 2 mg, IntraVENous, Q20MIN PRN, Darshan Arreaga MD 
  prochlorperazine (COMPAZINE) tablet 10 mg, 10 mg, Oral, Q6H PRN, Debbie Mccarthy MD 
  LORazepam (ATIVAN) injection 1 mg, 1 mg, IntraVENous, Q4H PRN, Debbie Mccarthy MD 
  diphenhydrAMINE (BENADRYL) injection 25 mg, 25 mg, IntraVENous, Q6H PRN, Debbie Mccarthy MD 
  acetaminophen (TYLENOL) tablet 650 mg, 650 mg, Oral, Q4H PRN, Debbie Mccarthy MD 
  senna (SENOKOT) tablet 8.6 mg, 1 Tab, Oral, BID, Debbie Mccarthy MD, 8.6 mg at 03/29/21 3912 
  bisacodyL (DULCOLAX) suppository 10 mg, 10 mg, Rectal, PRN, Debbie Mccarthy MD 
  haloperidol lactate (HALDOL) injection 2 mg, 2 mg, SubCUTAneous, Q1H PRN **OR** haloperidol lactate (HALDOL) injection 2 mg, 2 mg, IntraVENous, Q1H PRN, Debbie Mccarthy MD 
  albuterol-ipratropium (DUO-NEB) 2.5 MG-0.5 MG/3 ML, 3 mL, Nebulization, Q4H PRN, Debbie Mccarthy MD 
  glycopyrrolate (ROBINUL) injection 0.2 mg, 0.2 mg, SubCUTAneous, Q4H PRN, Debbie Mccarthy MD 
 
Facility-Administered Medications Ordered in Other Encounters:  
  saline peripheral flush soln 5 mL, 5 mL, InterCATHeter, PRN, Pete Ferrell MD, 5 mL at 03/25/21 1259 Orders: 
Orders Placed This Encounter  IP CONSULT TO SPIRITUAL CARE Standing Status:   Standing Number of Occurrences:   1 Order Specific Question:   Reason for Consult: Answer: Once on week one, then PRN. For [facility] Patients Only. For contracted patients, their primary hospice will continue to manage spiritual care needs.  DIET CLEAR LIQUID Standing Status:   Standing Number of Occurrences:   [address] Standing Status:   Standing Number of Occurrences:   1  
 NURSING-MISCELLANEOUS: Comfort Care Measures CONTINUOUS Standing Status:   Standing Number of Occurrences:   1 Order Specific Question:   Description of Order: Answer:   Comfort Care Measures  PAGAN CATHETER, CARE 1. Pagan Catheter care every shift and PRN 2.  Notify Physician of Pagan Catheter leakage, occlusion, gross adherent sediment or accidental removal 
3. Change Pagan 30 days after insertion. Standing Status:   Standing Number of Occurrences:   [id number]ianad May scan bladder PRN for urinary retention and or patient discomfort Standing Status:   Standing Number of Occurrences:   [id number]  
 NURSING ASSESSMENT:  SPECIFY Assess for GIP, routine, or respite level of care. 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 He received a 4-month treatment with FOLFOX under the care of Dr. Melissa Carter... 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 He received a 4-month treatment with FOLFOX under the care of Dr. Damaris Elizabeth in early 2017 but has been lost to follow-up. He returns now with diagnoses related to his cecal cancer that have a profound adverse effect on his prognosis including: Liver failure, jaundice, hepatic encephalopathy, hyper ammonemia, hypoalbuminemia, abdominal pain, refractory nausea and vomiting, anorexia, dyspnea, hypoxic respiratory failure, extensive hepatic metastases and extensive bilateral pulmonary metastases. COPD is a diagnosis unrelated to his metastatic cancer that adversely affects prognosis as well. He has evidenced hypotension due to dehydration associated with his nausea and vomiting at Dr. Helena Pepper office today and upon evaluation in his home by the [facility] liaison. Despite receiving 1 L of IV normal saline in the oncology outpatient clinic this morning, he requires urgent admission to inpatient hospice care to control his vomiting, abdominal pain, dyspnea and delirium. His life expectancy is less than 4 weeks. Of note his CEA level has risen from 2372 in February up to  3036 in the last week. Standing Status:   Standing Number of Occurrences:   1 Order Specific Question:   Please describe the test or procedure you would like to order.   
  Answer:   Assess for GIP, routine, or respite level of care.  DO NOT RESUSCITATE Standing Status:   Standing Number of Occurrences:   1 Order Specific Question:   Comfort Measures Only? Answer: Yes  lidocaine-prilocaine (EMLA) 2.5-2.5 % cream (Patient Supplied) OP SIG:Apply to port about 45 minutes prior to access  sodium chloride (NS) flush 10 mL  heparin (porcine) pf 300 Units  sodium chloride (NS) flush 10 mL  heparin (porcine) pf 300 Units  OR Linked Order Group  morphine injection 2 mg  morphine injection 2 mg  prochlorperazine (COMPAZINE) tablet 10 mg  LORazepam (ATIVAN) injection 1 mg  diphenhydrAMINE (BENADRYL) injection 25 mg  
 acetaminophen (TYLENOL) tablet 650 mg  
 senna (SENOKOT) tablet 8.6 mg  
 bisacodyL (DULCOLAX) suppository 10 mg  
 OR Linked Order Group  haloperidol lactate (HALDOL) injection 2 mg  haloperidol lactate (HALDOL) injection 2 mg  albuterol-ipratropium (DUO-NEB) 2.5 MG-0.5 MG/3 ML Order Specific Question:   MODE OF DELIVERY Answer:   [place]  glycopyrrolate (ROBINUL) injection 0.2 mg  
 INITIAL PHYSICIAN ORDER: HOSPICE Level Of Care: General Inpatient; Reason for Admission: management of acute pain, dyspnea, weakness, nausea, vomiting Standing Status:   Standing Number of Occurrences:   1 Order Specific Question:   Status Answer:   Hospice Order Specific Question:   Level Of Care Answer:   General Inpatient Order Specific Question:   Reason for Admission Answer:   management of acute pain, dyspnea, weakness, nausea, vomiting Order Specific Question:   Inpatient Hospitalization Certified Necessary for the Following Reasons Answer:   3. Patient receiving treatment that can only be provided in an inpatient setting (further clarification in H&P documentation) Order Specific Question:   Admitting Diagnosis Answer:    Adenocarcinoma of colon metastatic to liver ([facility] [address] [5223214] Order Specific Question:   Terminal Prognosis Diagnosis(es) Answer:   Intractable nausea and vomiting [4691897] Order Specific Question:   Terminal Prognosis Diagnosis(es) Answer:   Cancer associated pain [[id number]] Order Specific Question:   Terminal Prognosis Diagnosis(es) Answer:   Metastasis to lung [facility]) [[id number]] Order Specific Question:   Terminal Prognosis Diagnosis(es) Answer:   Acute respiratory failure with hypoxia ([facility] [address] [[id number]] Order Specific Question:   Terminal Prognosis Diagnosis(es) Answer:   Hyperammonemia ([facility] [address] [555518] Order Specific Question:   Terminal Prognosis Diagnosis(es) Answer:   Metabolic encephalopathy [025.71. ICD-9-CM] Order Specific Question:   Terminal Prognosis Diagnosis(es) Answer:   Dyspnea and respiratory abnormalities [764633] Order Specific Question:   Terminal Prognosis Diagnosis(es) Answer:   COPD mixed type [facility]) [[id number]] Order Specific Question:   Terminal Prognosis Diagnosis(es) Answer:   Jaundice with stoppage of bile flow [476123] Order Specific Question:   Admitting Physician Answer:   Yesi Perez Order Specific Question:   Attending Physician Answer:   Yesi Perez Order Specific Question:   Discharge Plan: Answer:   Home with Home Hospice  IP CONSULT TO SOCIAL WORK Standing Status:   Standing Number of Occurrences:   1 Order Specific Question:   Reason for Consult: Answer: For [facility] Patients Only. For contracted patients, their primary hospice will continue to manage social work needs. Allergies: 
No Known Allergies Care Plan: 
Multidisciplinary Problems (Active) Problem: Falls - Risk of   
 Dates: Start: 03/26/21 Disciplines: Interdisciplinary Goal: *Absence of Falls Dates: Start: 03/26/21   Expected End: 04/02/21  Description: Document Hossein Cortez Fall Risk and appropriate interventions in the flowsheet. Disciplines: Interdisciplinary Problem: Pressure Injury - Risk of   
 Dates: Start: 03/26/21 Disciplines: Interdisciplinary Goal: *Prevention of pressure injury Dates: Start: 03/26/21   Expected End: 04/02/21 Description: Document Siddhartha Scale and appropriate interventions in the flowsheet. Disciplines: Interdisciplinary Care Plan Problems/Goals Progressing Towards Goal (2)   
  *Absence of Falls (Falls - Risk of) Disciplines:  Interdisciplinary Expected end:  04/02/21 Outcome: Progressing Towards Goal By Sarah Soto RN on 03/29/21 0037 *Prevention of pressure injury (Pressure Injury - Risk of) Disciplines:  Interdisciplinary Expected end:  04/02/21 Outcome: Progressing Towards Goal By Sarah Soto RN on 03/29/21 2586 Resolved/Met (2) Patient/Family Education (Patient Education: Go to Patient Education Activity) Disciplines:  Interdisciplinary Expected end:  04/02/21 Outcome: Resolved/Met By Aicha Bob RN on 03/27/21 1310 Patient/Family Education (Patient Education: Go to Patient Education Activity) Disciplines:  Interdisciplinary Expected end:  04/02/21 Outcome: Resolved/Met By Aicha Bob RN on 03/27/21 1413  
  
  
 
  
  
  
  
  
 
 
___________________ Care Team Notes POC/IDG Notes [address] [place] [address] IDG Nurse Notes by Chandan Silva RN at 03/29/21 1330  Version 1 of 1 Author: Chandan Silva RN Service: NURSING Author Type: Registered Nurse Filed: 03/29/21 1330 Date of Service: 03/29/21 1330 Status: Signed : Chandan Silva RN (Registered Nurse) Patient: Grant Andersen Date: 03/29/21 Time: 1:30 PM 
 
[facility] Nurse Notes 1st IDG: Pt is a 29-year-old male with metastatic colon cancer who is here Respite level of care for management of pain.  
Pagna with UOP of 
 IV access: Right Chest Venous access power port device/ not accessed PO intake: Clear Liquids Oxygen: 2/L PRN Wounds: Right Abdomen Surgical Site PRN: None Scheduled meds:  Senokot 8.6mg Plan: Comprehensive plan of care reviewed. IDG and pt./family in agreement with plan of care. The IDG identifies through on-going assessment when a change is needed to the POC; the pt/family will receive care and services necessitated by changes in POC. Medications reviewed by the pharmacist and Medical Director. Signed by: Cat Archer RN 
 
  
[facility] IDG  Notes by Nadira Flores LMSW at 03/29/21 1034  Version 1 of 1 Author: Nadira Flores LMSW Service: Licensed Clinical  Author Type:  Filed: 03/29/21 1035 Date of Service: 03/29/21 1034 Status: Signed : Nadira Flores LMSW () SW has read the initial comprehensive assessment and plan of care and acknowledges no urgent needs and is in agreement with plan SW to assess coping and needs each visit and offer availability. [facility] IDG Nurse Notes by Sam Ramírez RN at 03/26/21 1809  Version 1 of 1 Author: Sam Ramírez RN Service: NURSING Author Type: Registered Nurse Filed: 03/26/21 1814 Date of Service: 03/26/21 1809 Status: Signed : Sam Ramírez RN (Registered Nurse) Patient: Yris Velasco Date: 03/26/21 Time: 1300 [facility] Nurse [facility] for GIP level of care. Pt has a hospice dx of metastatic colon cancer. Pt is alert and oriented x 3. Denies pain on admission. Pt states the ambulance ride was bumpy, but denies pain on transfer. Pt states \"I would be a lot better if they didn't bring me here. \"  Pt's skin is extremely jaundiced. BLE are edematous +1. Abdomen is distended, but soft. Pt had a BM this morning. Pt has a port that is not accessed. RR is regular and even and breath sounds are clear. Denies needs at this time.  Pt settled in bed with bed low and SR up and call bell within reach. Family is not present with pt. Admission complete and initial general hospice care plan initiated which includes spiritual, psychosocial and bereavement. No immediate needs at this time. Will continue to monitor pt for symptom management. IDG team, including MD, made aware of plan of care and immediate needs. No volunteer services at this time due to Covid-19. Signed by: Estbean Gamez RN Care Team Present:

## 2021-03-29 NOTE — PROGRESS NOTES
Walking rounds completed with Reyna Love RN. Pt idientified by name and . Pt is under GIP care with a hospice diagnosis of metastatic colon cancer. Pt is alert and oriented; able to voice needs and concerns. Pt is ambulatory with assistance. Continent of b/b. Requires minimal assistance with ADLs. Pt resting quietly in bed with eyes closed. Pt denies pain. RR even and non labored. No signs of NVD or SOB. Bed in lowest and locked position with siderails x2; call light in place and tab alert in place.  FLACC 0/10.  
  
 Pt resting quietly in bed with eyes closed; no signs of pain or distress noted. RR non labored on room air. No signs of agitation, NVD, or SOB. FLACC 010.  
  
2233 Pt resting comfortably in bed with eyes closed; no signs of pain or distress noted. RR non labored on room air. No signs of agitation, NVD, or SOB. FLACC 0/10.  
  
2328 Pt resting quietly in bed watching TV. Pt denies pain. RR non labored on room air. No signs of agitation, NVD, or SOB. FLACC 010.  
  
0107 Pt resting quietly in bed watching TV. Pt denies pain. RR non labored on room air. No signs of agitation, NVD, or SOB. FLACC 010.  
  
7544 PO resting comfortably in bed with eyes closed; no signs of pain or distress noted. RR non labored on room air. No signs of agitation, NVD, or SOB. FLACC 010.  
  
0546  Pt resting comfortably in bed with eyes closed; no signs of pain or distress noted. RR non labored on room air. No signs of agitation, NVD, or SOB. FLACC 010.  
  
 0601 Pt resting quietly in bed with eyes closed. No signs of pain or distress noted. RR non labored on room air. No signs of agitation, NVD, or SOB. FLACC 0/10. Report given to Steven Restrepo RN.

## 2021-03-29 NOTE — PROGRESS NOTES
0700 Bedside Report taken from [facility]. Pt identified. Pt in bed with eyes closed; displaying no signs or symptoms of pain, dyspnea, agitation,seizures, nausea, or vomiting. FLACC 0. Bed locked and low, side rails up, tabs/bed alarm in place for pt. safety. Call light with in reach, and door opened for continued monitoring. Care plan reviewed with Cna.  
 
0906 Pt observed and appears to be sleeping/resting, no facial grimacing, no moaning, easy relaxed respirations. No symptoms to manage at this time. Flacc 0/10. I let pt know that his breakfast was still there, but he was sound asleep. 6878 Pt now awake. Set him up for breakfast. He is dishoveld and has something all over his gown. After breakfast he will be assisted to the shower by cna. Pt denies pain. 1120 Pt watching tv. denies any needs at this time. 1356 Pt observed and appears to be sleeping/resting, no facial grimacing, no moaning, easy relaxed respirations. No symptoms to manage at this time. Flacc 0/10.

## 2021-03-29 NOTE — PROGRESS NOTES
Problem: Falls - Risk of 
Goal: *Absence of Falls Description: Document Joe Shipley Fall Risk and appropriate interventions in the flowsheet. Outcome: Progressing Towards Goal 
Note: Fall Risk Interventions: 
  
 
  
 
Medication Interventions: Bed/chair exit alarm, Patient to call before getting OOB Elimination Interventions: Bed/chair exit alarm, Call light in reach Problem: Pressure Injury - Risk of 
Goal: *Prevention of pressure injury Description: Document Siddhartha Scale and appropriate interventions in the flowsheet. Outcome: Progressing Towards Goal 
Note: Pressure Injury Interventions: 
Sensory Interventions: Assess changes in LOC, Float heels, Keep linens dry and wrinkle-free, Minimize linen layers, Pressure redistribution bed/mattress (bed type) Activity Interventions: Assess need for specialty bed, Pressure redistribution bed/mattress(bed type) Mobility Interventions: Float heels, HOB 30 degrees or less, Pressure redistribution bed/mattress (bed type) Nutrition Interventions: Document food/fluid/supplement intake Friction and Shear Interventions: Apply protective barrier, creams and emollients, HOB 30 degrees or less, Minimize layers

## 2021-03-29 NOTE — [facility] IDG SOCIAL WORKER NOTES
BRYANT has read the initial comprehensive assessment and plan of care and acknowledges no urgent needs and is in agreement with plan BRYANT to assess coping and needs each visit and offer availability.

## 2021-03-29 NOTE — PROGRESS NOTES
Problem: Falls - Risk of 
Goal: *Absence of Falls Description: Document Geni Durant Fall Risk and appropriate interventions in the flowsheet. Outcome: Progressing Towards Goal 
Note: Fall Risk Interventions: 
  
 
  
 
Medication Interventions: Bed/chair exit alarm, Patient to call before getting OOB Elimination Interventions: Bed/chair exit alarm, Call light in reach

## 2021-03-29 NOTE — [facility] IDG NURSE NOTES
Patient: Lorena Dawson Date: 03/29/21 Time: 1:30 PM 
 
[facility] Nurse Notes 1st IDG: Pt is a 73-year-old male with metastatic colon cancer who is here Respite level of care for management of pain. Pagan with UOP of 
 IV access: Right Chest Venous access power port device/ not accessed PO intake: Clear Liquids Oxygen: 2/L PRN Wounds: Right Abdomen Surgical Site PRN: None Scheduled meds:  Senokot 8.6mg Plan: Comprehensive plan of care reviewed. IDG and pt./family in agreement with plan of care. The IDG identifies through on-going assessment when a change is needed to the POC; the pt/family will receive care and services necessitated by changes in POC. Medications reviewed by the pharmacist and Medical Director.  
 
 
 
 
Signed by: Christi Khoury RN

## 2021-03-29 NOTE — PROGRESS NOTES
1500: Report received from off going RN. Pt admitted 3/27/21 for a 5 day respite stay with a hospice diagnosis of adenocarcinoma of the cecum. Pt in bed with eyes closed, resps regular and non-labored. Arouses easily, very jaundice in color. Denies any pain or discomfort at this time. 1804: Scheduled senna administered. Pt complaining of nausea. Comapzine 10mg PO administered. Family in room. No voiced requests at this time. Pt denies pain. 1845: Report given to oncoming RN. Pt now back in bed visiting with family. No further voiced complaints

## 2021-03-30 NOTE — PROGRESS NOTES
1845:  Patient report from Chema Waldrop RN. Patient ID by name and . Patient in bed resting with eyes closed. No s/sx of pain, dyspnea, or agitation observed. FLACC 0/10. Bed low and locked and all safety measures in place. Door open. 2319:  Assisted CNA to walk patient to bathroom and to change gown. Patient denies any pain or distress. FLACC 0/10.   
 
0215:  Asssited CNA to walk patient to bathroom. Patient continues to deny pain or needs. FLACC 0/10. 
 
0500:  Patient resting in bed with eyes closed. No s/sx of pain, dyspnea, or agitation observed. FLACC 0/10. Patient has not required any PRN medications this shift. Report to Flory Gary RN.

## 2021-03-30 NOTE — PROGRESS NOTES
Social work has confirmed with family that patient will be discharging home tomorrow. Discharge summary and orders completed.

## 2021-03-30 NOTE — PROGRESS NOTES
Demographics Information provided by: 
 
 
Name:           son Dayana Tariq Level of Care  GIP [] Routine  [] Respite   [x] From the in home program Yes [x] No []  Team                                                     
 
Diagnosis: colon cancer Insurance    Medicare [x]   Medicaid  []  Gina Fox  []      Other [] Social  
 [x]   Single []     []    [] Spouse name: Shabnam Cast Children: son Dayana Tariq states he is the only child Community Resources Used in the Home prior to admission Yes []  No [x] Financial Concerns :                  
 
[place] Application Needed   Yes []  No [x] Medicaid application needed Yes []  No [x] IFA Form Complete  Yes [x]   No [] Discharge Plans:   Dayana Tariq reports he is living with parents to provide caregiving support and he is prepared to take pt home, if needed. Work History : 
Retired [x] [employer] [] Part-time [] Disabled []  Service    Yes [x]  No [] Branch   [employer] []   [employer] [] [employer] [] [employer] []  [employer] []  [employer] [] Linked to [facility]   Yes []  No [] son does not know Referral made to [facility] resources Yes [] No [x] Advanced Directives Scanned in the system Living Will  Yes  []  No [] HCPOA     Yes  [x]  No [] DPOA        Yes  []  No [] DNR           Yes [x]  No [] Final Arrangements: Dayana Tariq reports parents have made plans, but he is not sure of the FH. Dayana Tariq confirms he will find out and notify staff Medical History and/or Narrative copied from the patients chart from the [address] [place] [address] Physician or Rn: 
 
[facility] Nurse Notes Pt admitted to [facility] from pt's home for GIP level of care. Pt has a hospice dx of metastatic colon cancer. Pt is alert and oriented x 3. Denies pain on admission. Pt states the ambulance ride was bumpy, but denies pain on transfer.  Pt states \"I would be a lot better if they didn't bring me here. \"  Pt's skin is extremely jaundiced. BLE are edematous +1. Abdomen is distended, but soft. Pt had a BM this morning. Pt has a port that is not accessed. RR is regular and even and breath sounds are clear. Denies needs at this time. Pt settled in bed with bed low and SR up and call bell within reach. Family is not present with pt. Admission complete and initial general hospice care plan initiated which includes spiritual, psychosocial and bereavement. No immediate needs at this time. Will continue to monitor pt for symptom management. IDG team, including MD, made aware of plan of care and immediate needs. No volunteer services at this time due to Covid-19. Volunteer discussion: Yes []    No [x] Goals of care for the patient and family: Johnlili reports family's goal is for comfort only, whether at the [facility] or home Coping and Bereavement:                          
 
Was a Referral made to Bereavement  Yes [] No [x]

## 2021-03-30 NOTE — PROGRESS NOTES
Problem: Falls - Risk of 
Goal: *Absence of Falls Description: Document Bridget Mcneil Fall Risk and appropriate interventions in the flowsheet. Outcome: Progressing Towards Goal 
Note: Fall Risk Interventions: 
  
 
  
 
Medication Interventions: Bed/chair exit alarm, Patient to call before getting OOB Elimination Interventions: Bed/chair exit alarm, Call light in reach Problem: Pressure Injury - Risk of 
Goal: *Prevention of pressure injury Description: Document Siddhartha Scale and appropriate interventions in the flowsheet. Outcome: Progressing Towards Goal 
Note: Pressure Injury Interventions: 
Sensory Interventions: Assess changes in LOC, Float heels, Keep linens dry and wrinkle-free, Minimize linen layers, Pressure redistribution bed/mattress (bed type) Activity Interventions: Assess need for specialty bed, Pressure redistribution bed/mattress(bed type) Mobility Interventions: Float heels, HOB 30 degrees or less, Pressure redistribution bed/mattress (bed type) Nutrition Interventions: Document food/fluid/supplement intake Friction and Shear Interventions: Apply protective barrier, creams and emollients, HOB 30 degrees or less, Minimize layers

## 2021-03-31 NOTE — PROGRESS NOTES
spoke with patient's wife and son Keith December via telephone over the telephone yesterday. Wife was having a difficult time being away from her . She stated she could feel something is wrong. Her son tired to assure her he was going to check on dad. She insisted she should be with him.  assured them both that Mr. Reginald Rutherford was getting excellent care. Mrs. Reginald Rutherford had a hard time believing her  was ok.  offered a prayer with her to which she replied afterward, \" It didn't help much\".  assured her she would have the nurse callback shortly to give her a medical update.

## 2021-03-31 NOTE — PROGRESS NOTES
Problem: Falls - Risk of 
Goal: *Absence of Falls Description: Document Lear Shaker Fall Risk and appropriate interventions in the flowsheet. Outcome: Progressing Towards Goal 
Note: Fall Risk Interventions: 
Mobility Interventions: Bed/chair exit alarm Mentation Interventions: Bed/chair exit alarm Medication Interventions: Bed/chair exit alarm Elimination Interventions: Bed/chair exit alarm, Call light in reach Problem: Pain Goal: Verbalize satisfaction of level of comfort and symptom control Outcome: Progressing Towards Goal

## 2021-03-31 NOTE — PROGRESS NOTES
0000:  Report received from Sharen Carrel, [facility]. Care assumed and pt identified by name and . Pt resting in bed and respirations present. No distress observed or voiced at this time and no s/sx of agitation, anxiety, pain, dyspnea, sob, n/v or seizures. FLACC score of 0/10. Bed low and locked, siderails x2, call light within pt's reach. Functional tab alert on and attached to pt. Family at bedside and no needs or concerns voiced at this time. It is understood by writer that previous nurse encouraged pt's wife to sleep on bedside chair cot due to safety concerns re: padded window bench, however, the patient's wife has chosen to sleep on the padded window bench, which she remains at this time. The patient's son is also at the bedside asleep. The bathroom light has been left on to give some illumination to the room as a safety precaution. CNA operating under RN supervision. 9011:  Pt resting in bed. No distress visualized. No s/sx of agitation, anxiety, pain, dyspnea, sob, n/v or seizures. Respirations present. Call light in reach. Tab alert on. Bed remains lowered and locked. Family at bedside sleeping (son on bedside cot and wife remains on windowside bench. No needs or concerns voiced at this time. 2809:  No acute changes at this time. Pt remains sleeping with unlabored breathing. Call light remains in reach. Tab alarm remains connected to patient. No s/sx of agitation, anxiety, pain, dyspnea, sob, n/v or seizures. Bed in lowest locked position with siderails x2. Family at bedside and no needs or concerns voiced at this time. 2390:  Sleeping with respirations present. Call light in reach. No s/sx of distress observed or voiced. FLACC score 0/10. Report given to [facility], RN.

## 2021-03-31 NOTE — PROGRESS NOTES
Report received. Walking rounds made. Pt identified by name. Pt is respite level of care with hospice dx of metastatic colon cancer. In bed with eyes closed. No s/s of pain, agitation or dyspnea. Bed low and SR up with tab alerts on. Family at bedside. 0930 Wife wants to get in bed with pt. Is very argumentative with son who is also at bedside. Wife and son informed that they would not be able to spend the night tonight. Son verbalizes understanding. 3466 Scheduled meds given. Pt denies pain or other needs at this time. Repositioned in bed for comfort. Lights low and shades closed per pt request. Family has left at this time. 1210 Pt set up for lunch. 1337 Pt is sleeping. No s/s of distress at this time. 1608 Pt continuing to sleep. 1630 Pt repositioned. No needs at this time. 1704 Scheduled senna given PO. Pt ate jello 1/2 cup jello and sips of juice for supper. Pt denies pain, nausea and vomiting.   
 
Report given to oncoming RN

## 2021-03-31 NOTE — PROGRESS NOTES
7472- The patient report taken from Juan Caraballo RN and walking rounds completed. Patient identified by name and . Patient is Respite stay for 5 days. Discharge plans are uncertain at this time as the patient's wife is sick also. The patient is resting quietly with no signs of distress. The bed is low and locked with two bed rails up and the call light within reach of the patient. 0900- Patient is awake and alert to person, place and time. Assisted to the bathroom with two assist and rolling walker. Patient is very weak and had a difficult time ambulating. Patient did have continent BM on the toilet but was unable to do self care. Patient was cleaned and assisted back to his bed. Patient continues to deny any pain or distress. Patient is jaundiced with ascites. 1200- Patient is sitting up in the bed feeding himself lunch. Patient had incontinent void. Patient was washed and a clean gown placed. He is too weak to get up to the bathroom at this time. 1500- Patient continues to rest in the bed. He continues to deny any distress at all. 1719- Patient had another incontinent void. Discussed placing a jenkins catheter and accessing the port. The patient is complaining of back pain. Patient agreed to both. Jenkins placed per policy and patient tolerated well. 250 of clear yellow urine out. Port access with 3/4 inch 20 gauze wells needle. Flushed with normal saline. Patient tolerated well. Administered Morphine 2 mg via IV port and Haldol 2 mg IV port for agitation and anxiety. Patient states that he is very worried about his wife at home. Called the patient's wife and he is talking on the telephone to him. 1745- Patient is sleeping and shows no signs of distress. - Patient continues to turn self in the bed. He states that he is in no pain and has no other distress. - Patient states the he has low back pain 6-7 on scale. He states that he is anxious.  Administered Morphine 2 mg IV port and Haldol 2 mg IV port. The patient's son and wife are at the bedside and were educated on the medications administered and voiced understanding. 2230- Patient is resting with his eyes closed and shows no signs of distress. Patient's wife and son are spending the night and were told that the lights and tv should be shut off by 1100 so the patient can rest. Patient's wife was discouraged from sleeping on the bench seat. She was encouraged to sleep on the cot.   
 
Report off to Bertha Marquez RN

## 2021-03-31 NOTE — DISCHARGE SUMMARY
Discharge Summary Patient: Trish Ventura MRN: [id number]  SSN: [id number] YOB: 1945  Age: 77 y.o. 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  
Problem List as of 3/31/2021 Date Reviewed: 3/26/2021 Codes Class Noted - Resolved Metastatic adenocarcinoma to liver [facility]) ICD-10-CM: C78.7 ICD-9-CM: 197.7  3/26/2021 - Present Non-intractable vomiting with nausea ICD-10-CM: R11.2 ICD-9-CM: 787.01  3/26/2021 - Present Cancer associated pain ICD-10-CM: G89.3 ICD-9-CM: 338.3  3/26/2021 - Present Acute liver failure without hepatic coma ICD-10-CM: K72.00 ICD-9-CM: 900  3/26/2021 - Present Lung metastasis ([facility] ICD-10-CM: C78.00 ICD-9-CM: 197.0  3/26/2021 - Present Respiratory failure with hypoxia [facility]) ICD-10-CM: J96.91 
ICD-9-CM: 518.81  3/26/2021 - Present Encephalopathy, metabolic DR-38-EW: R75.45 
ICD-9-CM: 348.31  3/26/2021 - Present Jaundice, obstructive, intrahepatic ICD-10-CM: K83.1 ICD-9-CM: 576.2  3/26/2021 - Present Hyperammonemia (HCC) ICD-10-CM: E61.29 ICD-9-CM: 270.6  3/26/2021 - Present Edema due to hypoalbuminemia ICD-10-CM: E88.09 
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 Discharge 04/01/21  via ambulance. Status at time of discharge is routine. Consults: None Significant Diagnostic Studies: None [facility] Medications:  
Current Discharge Medication List  
  
START taking these medications Details  
haloperidol (HALDOL) 2 mg/mL oral concentrate Take 2 mL by mouth every four (4) hours as needed for Agitation (or nausea). Qty: 30 mL, Refills: 0  
  
heparin, porcine, pf 100 unit/mL injection 5 mL by InterCATHeter route every month. HYDROmorphone (DILAUDID) 2 mg tablet Take 1 Tab by mouth every three (3) hours as needed (pain/dyspnea) for up to 14 days. Max Daily Amount: 16 mg. 
Qty: 112 Tab, Refills: 0 Associated Diagnoses: Cancer associated pain  
  
senna (SENOKOT) 8.6 mg tablet Take 1 Tab by mouth two (2) times a day. sodium chloride (NS) 10 mL by InterCATHeter route every month. CONTINUE these medications which have NOT CHANGED Details  
lidocaine-prilocaine (EMLA) topical cream Apply to port about 45 minutes prior to access Qty: 30 g, Refills: 5 STOP taking these medications  
  
 prochlorperazine (Compazine) 10 mg tablet Comments:  
Reason for Stopping:   
   
 ondansetron hcl (Zofran) 8 mg tablet Comments:  
Reason for Stopping:   
   
 aspirin 81 mg chewable tablet Comments:  
Reason for Stopping:   
   
 ondansetron (ZOFRAN ODT) 4 mg disintegrating tablet Comments:  
Reason for Stopping:   
   
 mirtazapine (REMERON SOL-TAB) 15 mg disintegrating tablet Comments:  
Reason for Stopping:   
   
  
 
 
Activity: Activity as tolerated with assistance Diet: Diet as tolerated Wound Care: Routine catheter care, inserted 3/30/21 Oxygen: Oxygen at 2 L/min via NC as needed for shortness of breath Equipment: Equipment as previously ordered in the home. Follow-up Appointments Procedures  FOLLOW UP VISIT Appointment in: Other (Specify) Follow-up with home hospice per agency protocol. Follow-up with home hospice per agency protocol. Standing Status:   Standing Number of Occurrences:   1 Order Specific Question:   Appointment in Answer: Other (Specify) Signed By: Adonis Arita True Doing, NP March 31, 2021

## 2021-03-31 NOTE — PROGRESS NOTES
Problem: Falls - Risk of 
Goal: *Absence of Falls Description: Document [place] Fall Risk and appropriate interventions in the flowsheet. Outcome: Progressing Towards Goal 
Note: Fall Risk Interventions: 
Mobility Interventions: Bed/chair exit alarm Mentation Interventions: Bed/chair exit alarm Medication Interventions: Bed/chair exit alarm Elimination Interventions: Bed/chair exit alarm, Call light in reach Problem: Pressure Injury - Risk of 
Goal: *Prevention of pressure injury Description: Document Siddhartha Scale and appropriate interventions in the flowsheet. Outcome: Progressing Towards Goal 
Note: Pressure Injury Interventions: 
Sensory Interventions: Assess changes in LOC, Float heels, Keep linens dry and wrinkle-free, Minimize linen layers Activity Interventions: Assess need for specialty bed Mobility Interventions: HOB 30 degrees or less Nutrition Interventions: Document food/fluid/supplement intake, Offer support with meals,snacks and hydration Friction and Shear Interventions: Apply protective barrier, creams and emollients, HOB 30 degrees or less, Lift sheet, Minimize layers Problem: Pain Goal: Verbalize satisfaction of level of comfort and symptom control Outcome: Progressing Towards Goal 
  
Problem: Anxiety/Agitation Goal: Verbalize and demonstrate ability to manage anxiety Description: The patient/family/caregiver will verbalize and demonstrate ability to manage the patient's anxiety throughout hospice care.  
Outcome: Progressing Towards Goal

## 2021-04-01 NOTE — PROGRESS NOTES
Spoke with son Darlene Villanueva this morning to advise that pt will be discharge today. Son in agreement and requesting pt arrive later this afternoon for him to prepare Transport is scheduled for 1pm.Son states no needed equipment at this time. Pt will be followed by [facility]. Advised son to contact hospice regarding any needs or concerns. All voiced understanding.

## 2021-04-01 NOTE — PROGRESS NOTES
Walking rounds completed with Kdaen Faye RN. Pt idientified by name and . Pt is under GIP care with a hospice diagnosis of metastatic colon cancer. Pt is alert and oriented; able to voice needs and concerns. Pt is ambulatory with assistance. Continent of b/b. Requires minimal assistance with ADLs. Pt resting quietly in bed with eyes closed. Pt denies pain. RR even and non labored. No signs of NVD or SOB. Bed in lowest and locked position with siderails x2; call light in place and tab alert in place.  FLACC 0/10.  
 
2011 Pt resting quietly in bed with eyes closed; no signs of pain or distress noted. RR non labored on room air. No signs of agitation, NVD, or SOB. FLACC 0/10.  
  
2241 Pt resting comfortably in bed with eyes closed; no signs of pain or distress noted. RR non labored on room air. No signs of agitation, NVD, or SOB. FLACC 0/10.  
  
2323 Pt resting quietly in bed watching TV. Pt denies pain. RR non labored on room air. No signs of agitation, NVD, or SOB. FLACC 0/10.  
  
0109 Pt resting quietly in bed watching TV. Pt denies pain. RR non labored on room air. No signs of agitation, NVD, or SOB. FLACC 0/10.  
  
9115 QO resting comfortably in bed with eyes closed; no signs of pain or distress noted. RR non labored on room air. No signs of agitation, NVD, or SOB. FLACC 010.  
  
0536  Pt resting comfortably in bed with eyes closed; no signs of pain or distress noted. RR non labored on room air. No signs of agitation, NVD, or SOB. FLACC 010.  
  
0621 Pt resting quietly in bed with eyes closed. No signs of pain or distress noted. RR non labored on room air. No signs of agitation, NVD, or SOB. FLACC 0/10.  
  
Walking rounds completed with Vera Lynn RN.

## 2021-04-01 NOTE — DISCHARGE INSTRUCTIONS
Activity: Activity as tolerated with assistance  Diet: Diet as tolerated  Wound Care: Routine catheter care, inserted 3/30/21  Oxygen: Oxygen at 2 L/min via NC as needed for shortness of breath  Equipment: Equipment as previously ordered in the home.

## 2021-04-01 NOTE — PROGRESS NOTES
0700- Report received, patient resting quietly in bed with no s/sx pain or distress. Call light within reach. Bed is low and locked, tab alert in place, and door left open for continuous monitoring. 0830- Patient resting with eyes closed. No s/sx of pain or distress. 3191- scheduled senna given at this time. Pt swallows meds whole. Saline flush and 500 U of heparin flushed per orders. Port d/c as patient is going home today. Pt c/o pain in abdomen when turning, but denies when settled. Pt turned to L side. Patient doesn't know who the president is, but he thinks he is in a hospital. Denies any needs. Warm blanket provided. 1130- Patient resting with eyes closed. No s/sx of pain or distress. Report given to Doris Sanchez, son. Spoke with Srinath Mendiola regarding Pt needs in the home. 1145- report given to Jorje richards RN with [facility]. 
 
1155- prn dilaudid and haldol given prior to ambulance ride. Patient drank some apple juice, bites of jellow and sips of broth and immediately started gagging. No vomiting, however. BRIDGET [facility] 112 here to take patient home. Patient no longer gagging.

## 2021-04-01 NOTE — PROGRESS NOTES
Problem: Falls - Risk of 
Goal: *Absence of Falls Description: Document Kali Abdullahi Fall Risk and appropriate interventions in the flowsheet. Outcome: Progressing Towards Goal 
Note: Fall Risk Interventions: 
Mobility Interventions: Bed/chair exit alarm, Patient to call before getting OOB Mentation Interventions: Adequate sleep, hydration, pain control, Bed/chair exit alarm, Door open when patient unattended Medication Interventions: Bed/chair exit alarm, Patient to call before getting OOB Elimination Interventions: Bed/chair exit alarm, Call light in reach

## 2021-04-05 NOTE — H&P
A full history and physical isnot required here. Mr Pedro Mar is enrolled with [facility] for routine care at home. He was admitted to [facility]  3/26/2021 for respite care. His symptoms of nausea and relatively modest somatic pain were responsive to oral medications given PRN. He was profoundly emaciated and jaundiced. Although drowsy he remained oriented with intact decisional capacity. 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 Benjie Lesches received a 4-month treatment with FOLFOX under the care of Dr. Guillermo Ojeda in early 2017 but has been lost to follow-up. Benjie Lesches returned to medical attention in 2020 with  Liver failure, jaundice, hepatic encephalopathy, hyper ammonemia, hypoalbuminemia, abdominal pain, refractory nausea and vomiting, anorexia, and  extensive hepatic metastases and extensive bilateral pulmonary metastases. He has evidenced hypotension due to dehydration associated with his nausea and vomiting. Upon evaluation in his home by the [facility] liaison 3/25/80043, despite receiving 1 L of IV normal saline in the oncology outpatient clinic that morning, he required around-the-clock care from family members to control his anorexia, abdominal pain, dyspnea and ataxia. His wife was simultaneously admitted to hospital with exacerbation of COPD and confusion. His caregiving son Leotis Apley, was overwhelmed by these demands and requested respite assistance urgently. Mr. Casa Musa life expectancy is less than 4 weeks.  Of note his CEA level has risen from 2372 in February up to  3036 in the last week. He is admitted to hospice for respite care support and subsequent routine level hospice care. An alternative venue for provision of care will be sought. Addendum: [facility] for COPD exacerbation.   He chose to return home with her and to receive supportive in home care from [facility] and his son's  Family. He  comfortably in his home 2020.

## 2021-10-24 NOTE — PROGRESS NOTES
3/25/21 saw pt today with Dr. Chacha Fonseca for follow up colon cancer with mets. Pt would like to pursue treatment. Regimen will be FOLFOX/avastin. Port already placed. Potential side effects reviewed. Will arrange chemo education. Rx sent to pharmacy. Follow up 3/30 to start. Infusion today for IVF, potassium and ammonia level. Provided opportunity to ask questions and all were discussed. Navigation will continue to follow. Mom reports that patient has complained of sore throat, headache, cough and vomiting. Symptoms started 3 days ago with a fever. Returned from [place] last week. Positive home Covid test yesterday     Vira Thomas RN  10/23/21 2129